# Patient Record
Sex: FEMALE | Race: WHITE | Employment: OTHER | URBAN - METROPOLITAN AREA
[De-identification: names, ages, dates, MRNs, and addresses within clinical notes are randomized per-mention and may not be internally consistent; named-entity substitution may affect disease eponyms.]

---

## 2017-12-21 PROBLEM — M17.11 PRIMARY OSTEOARTHRITIS OF RIGHT KNEE: Status: ACTIVE | Noted: 2017-12-21

## 2017-12-21 PROBLEM — S83.241A TEAR OF MEDIAL MENISCUS OF RIGHT KNEE, CURRENT: Status: ACTIVE | Noted: 2017-12-21

## 2018-02-05 PROBLEM — Z98.890 STATUS POST ARTHROSCOPY OF RIGHT KNEE: Status: ACTIVE | Noted: 2018-02-05

## 2022-07-29 PROBLEM — R42 LIGHTHEADEDNESS: Status: ACTIVE | Noted: 2022-07-29

## 2023-01-23 ENCOUNTER — OFFICE VISIT (OUTPATIENT)
Dept: RHEUMATOLOGY | Age: 73
End: 2023-01-23
Payer: MEDICARE

## 2023-01-23 VITALS
BODY MASS INDEX: 35.85 KG/M2 | HEIGHT: 64 IN | WEIGHT: 210 LBS | DIASTOLIC BLOOD PRESSURE: 76 MMHG | RESPIRATION RATE: 18 BRPM | SYSTOLIC BLOOD PRESSURE: 130 MMHG | OXYGEN SATURATION: 96 % | HEART RATE: 71 BPM

## 2023-01-23 DIAGNOSIS — R76.8 POSITIVE ANA (ANTINUCLEAR ANTIBODY): Primary | ICD-10-CM

## 2023-01-23 DIAGNOSIS — Z79.4 TYPE 2 DIABETES MELLITUS WITHOUT COMPLICATION, WITH LONG-TERM CURRENT USE OF INSULIN (HCC): ICD-10-CM

## 2023-01-23 DIAGNOSIS — Z11.59 ENCOUNTER FOR SCREENING FOR OTHER VIRAL DISEASES: ICD-10-CM

## 2023-01-23 DIAGNOSIS — M13.0 POLYARTHRITIS: ICD-10-CM

## 2023-01-23 DIAGNOSIS — R53.83 OTHER FATIGUE: ICD-10-CM

## 2023-01-23 DIAGNOSIS — E11.9 TYPE 2 DIABETES MELLITUS WITHOUT COMPLICATION, WITH LONG-TERM CURRENT USE OF INSULIN (HCC): ICD-10-CM

## 2023-01-23 DIAGNOSIS — R74.8 ELEVATED CK: ICD-10-CM

## 2023-01-23 PROCEDURE — 2022F DILAT RTA XM EVC RTNOPTHY: CPT | Performed by: INTERNAL MEDICINE

## 2023-01-23 PROCEDURE — G8417 CALC BMI ABV UP PARAM F/U: HCPCS | Performed by: INTERNAL MEDICINE

## 2023-01-23 PROCEDURE — 1090F PRES/ABSN URINE INCON ASSESS: CPT | Performed by: INTERNAL MEDICINE

## 2023-01-23 PROCEDURE — 3017F COLORECTAL CA SCREEN DOC REV: CPT | Performed by: INTERNAL MEDICINE

## 2023-01-23 PROCEDURE — 99204 OFFICE O/P NEW MOD 45 MIN: CPT | Performed by: INTERNAL MEDICINE

## 2023-01-23 PROCEDURE — G8427 DOCREV CUR MEDS BY ELIG CLIN: HCPCS | Performed by: INTERNAL MEDICINE

## 2023-01-23 PROCEDURE — 1123F ACP DISCUSS/DSCN MKR DOCD: CPT | Performed by: INTERNAL MEDICINE

## 2023-01-23 PROCEDURE — G8484 FLU IMMUNIZE NO ADMIN: HCPCS | Performed by: INTERNAL MEDICINE

## 2023-01-23 PROCEDURE — 1036F TOBACCO NON-USER: CPT | Performed by: INTERNAL MEDICINE

## 2023-01-23 PROCEDURE — 3046F HEMOGLOBIN A1C LEVEL >9.0%: CPT | Performed by: INTERNAL MEDICINE

## 2023-01-23 PROCEDURE — G8400 PT W/DXA NO RESULTS DOC: HCPCS | Performed by: INTERNAL MEDICINE

## 2023-01-23 RX ORDER — FERROUS SULFATE 325(65) MG
TABLET ORAL
COMMUNITY
Start: 2023-01-05

## 2023-01-23 RX ORDER — EMPAGLIFLOZIN 25 MG/1
TABLET, FILM COATED ORAL
COMMUNITY
Start: 2022-12-12

## 2023-01-23 RX ORDER — FUROSEMIDE 40 MG/1
TABLET ORAL AS NEEDED
COMMUNITY
Start: 2023-01-04

## 2023-01-23 ASSESSMENT — ENCOUNTER SYMPTOMS
COUGH: 0
TROUBLE SWALLOWING: 0
SHORTNESS OF BREATH: 0
COLOR CHANGE: 0
ABDOMINAL PAIN: 0
NAUSEA: 0
DIARRHEA: 0
VOMITING: 0

## 2023-01-23 NOTE — PROGRESS NOTES
Tony Gonzalez 1950 is a 67 y.o. female, here for evaluation of the following chief complaint(s):  New Patient (Patient here as a new patient for elevated NOAH, hx of OA. )         ASSESSMENT/PLAN:    Tony Gonzalez 1950 is a 67 y.o. female seen in consult for positive NOAH. 1.  Positive NOAH-she has a titer of 1: 320 which is a significantly elevated titer. She has multiple symptoms which raise some suspicion for differing autoimmune etiologies. She has some new onset jaw claudication and shoulder stiffness which raises some suspicion for GCA/PMR. She has history of erythema around the eyes and erythema on the chest which could be suggestive of shawl sign. That has improved but there is still some erythema on the chest.  She also has some dry skin on the hands which could be consistent with mechanics hands and slightly elevated CK of 897 which could be suggestive of dermatomyositis. Lupus would be in the differential as well. That said roughly 10% of the population will also have a positive NOAH without any underlying systemic connective tissue disease. I am not giving her any autoimmune diagnosis today but certainly will need further work-up as below. Also recommend that she stay up-to-date on age-appropriate cancer screenings. 2.  Type 2 diabetes-we will try to limit steroids is much as possible. 1. Positive NOAH (antinuclear antibody)  -     NOAH; Future  -     Miscellaneous sendout 3; Future  -     Miscellaneous Sendout; Future  -     Miscellaneous Sendout; Future  -     Aldolase; Future  -     ANCA Vasculitis Profile; Future  -     Angiotensin Converting Enzyme; Future  -     CK; Future  -     C-Reactive Protein; Future  -     Cyclic Citrul Peptide Antibody, IgG; Future  -     Rheumatoid Factor; Future  -     Sedimentation Rate; Future  -     Protein / creatinine ratio, urine; Future  -     Hepatitis B Core Antibody, Total; Future  -     Hepatitis B Surface Antibody;  Future  - Hepatitis B Surface Antigen; Future  -     Hepatitis C Antibody; Future  -     C3 Complement; Future  -     C4 Complement; Future  -     CBC with Auto Differential; Future  -     Comprehensive Metabolic Panel; Future  -     Sjogren's Ab (SS-A, SS-B); Future  -     Anti-RNP (MATTI Ab); Future  -     Gibbs (MATTI) Antibody IgG; Future  -     Anti-DNA Antibody, Double-Stranded; Future  -     Urinalysis; Future  -     Anti-Scleroderma Antibody; Future  -     Electrophoresis Protein, Serum; Future  -     Immunofixation serum profile; Future  -     IgG, IgA, IgM; Future  -     Lyme Disease Acute Reflexive Panel; Future  -     TSH; Future  2. Polyarthritis  -     NOAH; Future  -     Miscellaneous sendout 3; Future  -     Miscellaneous Sendout; Future  -     Miscellaneous Sendout; Future  -     Aldolase; Future  -     ANCA Vasculitis Profile; Future  -     Angiotensin Converting Enzyme; Future  -     CK; Future  -     C-Reactive Protein; Future  -     Cyclic Citrul Peptide Antibody, IgG; Future  -     Rheumatoid Factor; Future  -     Sedimentation Rate; Future  -     Protein / creatinine ratio, urine; Future  -     Hepatitis B Core Antibody, Total; Future  -     Hepatitis B Surface Antibody; Future  -     Hepatitis B Surface Antigen; Future  -     Hepatitis C Antibody; Future  -     C3 Complement; Future  -     C4 Complement; Future  -     CBC with Auto Differential; Future  -     Comprehensive Metabolic Panel; Future  -     Sjogren's Ab (SS-A, SS-B); Future  -     Anti-RNP (MATTI Ab); Future  -     Gibbs (MATTI) Antibody IgG; Future  -     Anti-DNA Antibody, Double-Stranded; Future  -     Urinalysis; Future  -     Anti-Scleroderma Antibody; Future  -     Electrophoresis Protein, Serum; Future  -     Immunofixation serum profile; Future  -     IgG, IgA, IgM; Future  -     Lyme Disease Acute Reflexive Panel; Future  -     TSH; Future  3. Other fatigue  -     NOAH;  Future  -     Miscellaneous sendout 3; Future  -     Miscellaneous Sendout; Future  -     Miscellaneous Sendout; Future  -     Aldolase; Future  -     ANCA Vasculitis Profile; Future  -     Angiotensin Converting Enzyme; Future  -     CK; Future  -     C-Reactive Protein; Future  -     Cyclic Citrul Peptide Antibody, IgG; Future  -     Rheumatoid Factor; Future  -     Sedimentation Rate; Future  -     Protein / creatinine ratio, urine; Future  -     Hepatitis B Core Antibody, Total; Future  -     Hepatitis B Surface Antibody; Future  -     Hepatitis B Surface Antigen; Future  -     Hepatitis C Antibody; Future  -     C3 Complement; Future  -     C4 Complement; Future  -     CBC with Auto Differential; Future  -     Comprehensive Metabolic Panel; Future  -     Sjogren's Ab (SS-A, SS-B); Future  -     Anti-RNP (MATTI Ab); Future  -     Gibbs (MATTI) Antibody IgG; Future  -     Anti-DNA Antibody, Double-Stranded; Future  -     Urinalysis; Future  -     Anti-Scleroderma Antibody; Future  -     Electrophoresis Protein, Serum; Future  -     Immunofixation serum profile; Future  -     IgG, IgA, IgM; Future  -     Lyme Disease Acute Reflexive Panel; Future  -     TSH; Future  4. Elevated CK  -     NOAH; Future  -     Miscellaneous sendout 3; Future  -     Miscellaneous Sendout; Future  -     Miscellaneous Sendout; Future  -     Aldolase; Future  -     ANCA Vasculitis Profile; Future  -     Angiotensin Converting Enzyme; Future  -     CK; Future  -     C-Reactive Protein; Future  -     Cyclic Citrul Peptide Antibody, IgG; Future  -     Rheumatoid Factor; Future  -     Sedimentation Rate; Future  -     Protein / creatinine ratio, urine; Future  -     Hepatitis B Core Antibody, Total; Future  -     Hepatitis B Surface Antibody; Future  -     Hepatitis B Surface Antigen; Future  -     Hepatitis C Antibody; Future  -     C3 Complement; Future  -     C4 Complement; Future  -     CBC with Auto Differential; Future  -     Comprehensive Metabolic Panel; Future  -     Sjogren's Ab (SS-A, SS-B);  Future  - Anti-RNP (MATTI Ab); Future  -     Gibbs (MATTI) Antibody IgG; Future  -     Anti-DNA Antibody, Double-Stranded; Future  -     Urinalysis; Future  -     Anti-Scleroderma Antibody; Future  -     Electrophoresis Protein, Serum; Future  -     Immunofixation serum profile; Future  -     IgG, IgA, IgM; Future  -     Lyme Disease Acute Reflexive Panel; Future  -     TSH; Future  5. Encounter for screening for other viral diseases   -     Hepatitis B Core Antibody, Total; Future  -     Hepatitis B Surface Antibody; Future  -     Hepatitis B Surface Antigen; Future  6. Type 2 diabetes mellitus without complication, with long-term current use of insulin ([facility] [address]      Return in about 4 weeks (around 2/20/2023). Subjective   SUBJECTIVE/OBJECTIVE:    HPI: Jared Carson 1950 is a 67 y.o. female seen in consult for positive NOAH. Patient states that around Thanksgiving she started having lower extremity edema as well as some swelling in the face particularly around the eyes. She states that she also had erythema around the eyes at that time and erythema on the chest which has since resolved. She states that she was given a diuretic and the leg swelling improved. She states that for the last 2 weeks she has been having jaw pain when she chews. She has not had headache or vision changes. She did have 1 isolated low-grade fever about 3 weeks ago. She also has some stiffness in the shoulders and hands. She is having a lot of issues with fatigue off and on. She apparently saw nephrology and they put her on an iron pill. It in review of records from her PCP there is mention of nephrotic syndrome and she does have insulin-dependent diabetes. She states that she was put on a course of steroids around Thanksgiving and then again just recently that she finished a 10-day course about 4 days ago.   Patient states that she does not really notice that she felt a whole lot different but her  feels like she did feel better on the steroids. She states that today is a decent day. She states that the hips are maybe a little stiff but not overly bothersome. She has also noticed the skin on her hands peeling. She has had a 23 pound weight loss since Thanksgiving but has not had much of an appetite. She has Raynaud's but this is a longstanding issue since she was a teenager. I reviewed blood work from her PCP which shows a positive NOAH of 1: 320, low positive rheumatoid factor of 18, and an elevated CK of 897. Past Medical History:   Diagnosis Date    Diabetes ([facility] [address]     Hyperlipemia     Hypertension     MMT (medial meniscus tear)     RIGHT  KNEE  AND SCHEDULED FOR THE SURGERY ON 01/30/2018    Primary osteoarthritis of right knee     Thyroid disease         Review of Systems   Constitutional:  Positive for fatigue. Negative for fever. HENT:  Negative for mouth sores and trouble swallowing. Respiratory:  Negative for cough and shortness of breath. Cardiovascular:  Positive for leg swelling. Negative for chest pain. Gastrointestinal:  Negative for abdominal pain, diarrhea, nausea and vomiting. Genitourinary:  Negative for dysuria and hematuria. Musculoskeletal:  Positive for arthralgias. Negative for joint swelling. Skin:  Positive for rash. Negative for color change. Neurological:  Positive for weakness. Negative for numbness. Hematological:  Negative for adenopathy. All other systems reviewed and are negative. Objective   Vitals:    01/23/23 1102   BP: 130/76   Pulse: 71   Resp: 18   SpO2: 96%      Physical Exam  Constitutional:       General: She is not in acute distress. Appearance: Normal appearance. HENT:      Head: Normocephalic and atraumatic. Right Ear: External ear normal.      Left Ear: External ear normal.      Nose: Nose normal.   Eyes:      General: No scleral icterus.   Pulmonary:      Effort: Pulmonary effort is normal.   Musculoskeletal:         General: Tenderness present. No swelling or deformity. Comments: She has Heberden's nodes. There is some tenderness in the shoulders but no synovitis on exam.   Skin:     General: Skin is warm and dry. Findings: No rash. Comments: Chest is a little erythematous. She does have dry skin on the hands which could be consistent with mechanics hands but no Gottron's papules. Neurological:      General: No focal deficit present. Mental Status: She is alert and oriented to person, place, and time. Mental status is at baseline. Comments: Strength is 4+ out of 5 in the hip flexors. 5 out of 5 throughout elsewhere. Psychiatric:         Mood and Affect: Mood normal.         Behavior: Behavior normal.          Lab Results   Component Value Date    WBC 7.6 07/30/2022    HGB 11.1 (L) 07/30/2022    HCT 34.0 (L) 07/30/2022    MCV 86.4 07/30/2022     07/30/2022     Lab Results   Component Value Date     07/30/2022    K 4.2 07/30/2022     07/30/2022    CO2 27 07/30/2022    BUN 19 07/30/2022    CREATININE 0.74 07/30/2022    GLUCOSE 106 (H) 07/30/2022    CALCIUM 8.4 07/30/2022    PROT 6.7 07/30/2022    LABALBU 3.5 07/30/2022    BILITOT 0.2 07/30/2022    ALKPHOS 68 07/30/2022    AST 26 07/30/2022    ALT 15 07/30/2022     No results found for: NOAH  No results found for: RHEUMFACTOR  No results found for: SEDRATE  No results found for: CRP         An electronic signature was used to authenticate this note. This note was generated with a voice recognition dictation system. Please disregard any errors or omission which have escaped my review.     --Raphael Eldridge, DO

## 2023-01-23 NOTE — PATIENT INSTRUCTIONS
You have some features that could be concerning for underlying autoimmune disease but will need further workup

## 2023-01-31 DIAGNOSIS — R53.83 OTHER FATIGUE: ICD-10-CM

## 2023-01-31 DIAGNOSIS — R76.8 POSITIVE ANA (ANTINUCLEAR ANTIBODY): ICD-10-CM

## 2023-01-31 DIAGNOSIS — R74.8 ELEVATED CK: ICD-10-CM

## 2023-01-31 DIAGNOSIS — Z11.59 ENCOUNTER FOR SCREENING FOR OTHER VIRAL DISEASES: ICD-10-CM

## 2023-01-31 DIAGNOSIS — M13.0 POLYARTHRITIS: ICD-10-CM

## 2023-01-31 LAB
ALBUMIN SERPL-MCNC: 3.5 G/DL (ref 3.5–5.2)
ALP BLD-CCNC: 57 U/L (ref 35–104)
ALT SERPL-CCNC: 105 U/L (ref 0–32)
ANION GAP SERPL CALCULATED.3IONS-SCNC: 18 MMOL/L (ref 7–16)
AST SERPL-CCNC: 194 U/L (ref 0–31)
BACTERIA: ABNORMAL /HPF
BASOPHILS ABSOLUTE: 0.02 E9/L (ref 0–0.2)
BASOPHILS RELATIVE PERCENT: 0.4 % (ref 0–2)
BILIRUB SERPL-MCNC: 0.4 MG/DL (ref 0–1.2)
BILIRUBIN URINE: NEGATIVE
BLOOD, URINE: NEGATIVE
BUN BLDV-MCNC: 37 MG/DL (ref 6–23)
C-REACTIVE PROTEIN: 3.5 MG/DL (ref 0–0.4)
C3 COMPLEMENT: 145 MG/DL (ref 90–180)
C4 COMPLEMENT: 25 MG/DL (ref 10–40)
CALCIUM SERPL-MCNC: 9.3 MG/DL (ref 8.6–10.2)
CHLORIDE BLD-SCNC: 101 MMOL/L (ref 98–107)
CLARITY: ABNORMAL
CO2: 19 MMOL/L (ref 22–29)
COLOR: YELLOW
CREAT SERPL-MCNC: 1.3 MG/DL (ref 0.5–1)
CREATININE URINE: 181 MG/DL (ref 29–226)
EOSINOPHILS ABSOLUTE: 0.24 E9/L (ref 0.05–0.5)
EOSINOPHILS RELATIVE PERCENT: 4.6 % (ref 0–6)
GFR SERPL CREATININE-BSD FRML MDRD: 43 ML/MIN/1.73
GLUCOSE BLD-MCNC: 129 MG/DL (ref 74–99)
GLUCOSE URINE: >=1000 MG/DL
HCT VFR BLD CALC: 37 % (ref 34–48)
HEMOGLOBIN: 11.2 G/DL (ref 11.5–15.5)
IMMATURE GRANULOCYTES #: 0.02 E9/L
IMMATURE GRANULOCYTES %: 0.4 % (ref 0–5)
KETONES, URINE: NEGATIVE MG/DL
LEUKOCYTE ESTERASE, URINE: ABNORMAL
LYMPHOCYTES ABSOLUTE: 0.77 E9/L (ref 1.5–4)
LYMPHOCYTES RELATIVE PERCENT: 14.9 % (ref 20–42)
MCH RBC QN AUTO: 27.1 PG (ref 26–35)
MCHC RBC AUTO-ENTMCNC: 30.3 % (ref 32–34.5)
MCV RBC AUTO: 89.6 FL (ref 80–99.9)
MONOCYTES ABSOLUTE: 0.62 E9/L (ref 0.1–0.95)
MONOCYTES RELATIVE PERCENT: 12 % (ref 2–12)
NEUTROPHILS ABSOLUTE: 3.5 E9/L (ref 1.8–7.3)
NEUTROPHILS RELATIVE PERCENT: 67.7 % (ref 43–80)
NITRITE, URINE: NEGATIVE
PDW BLD-RTO: 18 FL (ref 11.5–15)
PH UA: 5.5 (ref 5–9)
PLATELET # BLD: 227 E9/L (ref 130–450)
PMV BLD AUTO: 11.3 FL (ref 7–12)
POTASSIUM SERPL-SCNC: 5.6 MMOL/L (ref 3.5–5)
PROTEIN PROTEIN: 9 MG/DL (ref 0–12)
PROTEIN UA: NEGATIVE MG/DL
PROTEIN/CREAT RATIO: 0
PROTEIN/CREAT RATIO: 0 (ref 0–0.2)
RBC # BLD: 4.13 E12/L (ref 3.5–5.5)
RBC UA: ABNORMAL /HPF (ref 0–2)
RHEUMATOID FACTOR: 18 IU/ML (ref 0–13)
SODIUM BLD-SCNC: 138 MMOL/L (ref 132–146)
SPECIFIC GRAVITY UA: 1.02 (ref 1–1.03)
TOTAL CK: 2495 U/L (ref 20–180)
TSH SERPL DL<=0.05 MIU/L-ACNC: 22.89 UIU/ML (ref 0.27–4.2)
UROBILINOGEN, URINE: 0.2 E.U./DL
WBC # BLD: 5.2 E9/L (ref 4.5–11.5)
WBC UA: ABNORMAL /HPF (ref 0–5)

## 2023-02-01 LAB
ANTI DNA DOUBLE STRANDED: NEGATIVE
ANTI-NUCLEAR ANTIBODY (ANA): NEGATIVE
ENA TO RNP ANTIBODY: NEGATIVE
ENA TO SMITH (SM) ANTIBODY: NEGATIVE
ENA TO SSA (RO) ANTIBODY: NEGATIVE
ENA TO SSB (LA) ANTIBODY: NEGATIVE
HBV SURFACE AB TITR SER: NORMAL {TITER}
HEPATITIS B SURFACE ANTIGEN INTERPRETATION: NORMAL
HEPATITIS C ANTIBODY INTERPRETATION: NORMAL
SCLERODERMA (SCL-70) AB: NEGATIVE
SEDIMENTATION RATE, ERYTHROCYTE: 60 MM/HR (ref 0–20)

## 2023-02-02 LAB — HEPATITIS B CORE TOTAL ANTIBODY: NONREACTIVE

## 2023-02-03 LAB
ALBUMIN SERPL-MCNC: 2.1 G/DL (ref 3.5–4.7)
ALDOLASE: 19.3 U/L (ref 1.2–7.6)
ALPHA-1-GLOBULIN: 0.4 G/DL (ref 0.2–0.4)
ALPHA-2-GLOBULIN: 1.2 G/DL (ref 0.5–1)
ANGIOTENSIN CONVERTING ENZYME: <10 U/L (ref 16–85)
BETA GLOBULIN: 1.3 G/DL (ref 0.8–1.3)
CYCLIC CITRULLINATED PEPTIDE ANTIBODY IGG: 1 U/ML (ref 0–7)
ELECTROPHORESIS: ABNORMAL
GAMMA GLOBULIN: 1.4 G/DL (ref 0.7–1.6)
IGA: 229 MG/DL (ref 70–400)
IGG: 1107 MG/DL (ref 700–1600)
IGM: 115 MG/DL (ref 40–230)
IMMUNOFIXATION RESULT, SERUM: NORMAL
LYME, EIA: 0.26 LIV (ref 0–1.2)
TOTAL PROTEIN: 6.3 G/DL (ref 6.4–8.3)

## 2023-02-05 LAB
ANCA IFA PATTERN: NORMAL
ANCA IFA TITER: NORMAL
Lab: NORMAL
MYELOPEROXIDASE AB: 0 AU/ML (ref 0–19)
REPORT: NORMAL
SERINE PROTEASE 3 AB: 0 AU/ML (ref 0–19)
THIS TEST SENT TO: NORMAL

## 2023-02-07 LAB
Lab: NORMAL
REPORT: NORMAL
THIS TEST SENT TO: NORMAL

## 2023-02-21 LAB
Lab: NORMAL
REPORT: NORMAL
THIS TEST SENT TO: NORMAL

## 2023-02-27 ENCOUNTER — TELEPHONE (OUTPATIENT)
Dept: RHEUMATOLOGY | Age: 73
End: 2023-02-27

## 2023-02-27 ENCOUNTER — OFFICE VISIT (OUTPATIENT)
Dept: RHEUMATOLOGY | Age: 73
End: 2023-02-27
Payer: MEDICARE

## 2023-02-27 VITALS — HEIGHT: 64 IN | BODY MASS INDEX: 35.85 KG/M2 | WEIGHT: 210 LBS

## 2023-02-27 DIAGNOSIS — E03.9 ACQUIRED HYPOTHYROIDISM: ICD-10-CM

## 2023-02-27 DIAGNOSIS — Z79.4 TYPE 2 DIABETES MELLITUS WITHOUT COMPLICATION, WITH LONG-TERM CURRENT USE OF INSULIN (HCC): ICD-10-CM

## 2023-02-27 DIAGNOSIS — E11.9 TYPE 2 DIABETES MELLITUS WITHOUT COMPLICATION, WITH LONG-TERM CURRENT USE OF INSULIN (HCC): ICD-10-CM

## 2023-02-27 DIAGNOSIS — E66.01 SEVERE OBESITY (BMI 35.0-39.9) WITH COMORBIDITY (HCC): ICD-10-CM

## 2023-02-27 DIAGNOSIS — M35.3 POLYMYALGIA (HCC): Primary | ICD-10-CM

## 2023-02-27 DIAGNOSIS — M35.3 POLYMYALGIA (HCC): ICD-10-CM

## 2023-02-27 DIAGNOSIS — Q99.8 MYOSITIS ASSOCIATED ANTIBODY POSITIVE: ICD-10-CM

## 2023-02-27 DIAGNOSIS — I10 ESSENTIAL HYPERTENSION: ICD-10-CM

## 2023-02-27 DIAGNOSIS — R76.8 POSITIVE ANA (ANTINUCLEAR ANTIBODY): ICD-10-CM

## 2023-02-27 LAB
C-REACTIVE PROTEIN: 1.5 MG/DL (ref 0–0.4)
SEDIMENTATION RATE, ERYTHROCYTE: 48 MM/HR (ref 0–20)
TOTAL CK: 1969 U/L (ref 20–180)

## 2023-02-27 PROCEDURE — G8400 PT W/DXA NO RESULTS DOC: HCPCS | Performed by: INTERNAL MEDICINE

## 2023-02-27 PROCEDURE — 1090F PRES/ABSN URINE INCON ASSESS: CPT | Performed by: INTERNAL MEDICINE

## 2023-02-27 PROCEDURE — 3046F HEMOGLOBIN A1C LEVEL >9.0%: CPT | Performed by: INTERNAL MEDICINE

## 2023-02-27 PROCEDURE — G8417 CALC BMI ABV UP PARAM F/U: HCPCS | Performed by: INTERNAL MEDICINE

## 2023-02-27 PROCEDURE — 1123F ACP DISCUSS/DSCN MKR DOCD: CPT | Performed by: INTERNAL MEDICINE

## 2023-02-27 PROCEDURE — 99215 OFFICE O/P EST HI 40 MIN: CPT | Performed by: INTERNAL MEDICINE

## 2023-02-27 PROCEDURE — 2022F DILAT RTA XM EVC RTNOPTHY: CPT | Performed by: INTERNAL MEDICINE

## 2023-02-27 PROCEDURE — 1036F TOBACCO NON-USER: CPT | Performed by: INTERNAL MEDICINE

## 2023-02-27 PROCEDURE — G8484 FLU IMMUNIZE NO ADMIN: HCPCS | Performed by: INTERNAL MEDICINE

## 2023-02-27 PROCEDURE — 3017F COLORECTAL CA SCREEN DOC REV: CPT | Performed by: INTERNAL MEDICINE

## 2023-02-27 PROCEDURE — G8427 DOCREV CUR MEDS BY ELIG CLIN: HCPCS | Performed by: INTERNAL MEDICINE

## 2023-02-27 ASSESSMENT — ENCOUNTER SYMPTOMS
ABDOMINAL PAIN: 0
NAUSEA: 0
SHORTNESS OF BREATH: 0
COUGH: 0
DIARRHEA: 0
COLOR CHANGE: 1
VOMITING: 0
TROUBLE SWALLOWING: 1

## 2023-02-27 NOTE — PATIENT INSTRUCTIONS
Your symptoms may very well be thyroid related but I have decent suspicion for underlying autoimmune muscle disease    Have EMG and CT scans

## 2023-02-27 NOTE — PROGRESS NOTES
José Miguel Boyle 1950 is a 67 y.o. female, here for evaluation of the following chief complaint(s):  Follow-up (Patient here for follow up on blood work and positive gregg, polyarthritis. )         ASSESSMENT/PLAN:    José Miguel Boyle 1950 is a 67 y.o. female seen in follow-up for polymyalgia and positive GREGG. 1.  Polymyalgia/muscle weakness-she was initially referred for positive GREGG which upon repeat is negative. MATTI is negative as well. CK and aldolase are elevated. CK is now in the 2000 range. Inflammatory markers are elevated as well. However her TSH was also very high at 22. Her myositis panel did come back with a week positive OJ antibody which can be seen with antisynthetase syndrome. 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 She is seeing her PCP and getting the thyroid regulated. Her Synthroid dose was just increased. 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 Last visit she had a faint shawl rash which has since resolved. She does appear to be have some mechanics hands. She does not have distinct Gottron papules but a little bit of erythema over a few MCP joints. She has Raynaud's but this is longstanding since she was 24years old. She does not really have inflammatory arthritis. She has no respiratory symptoms. At this point from our end we will let primary care sort things out with her thyroid but we will check a CT of the chest abdomen and pelvis as there is an association with malignancy with antisynthetase syndrome. We will also get an EMG of the right upper extremity and may need to consider muscle biopsy.   She is otherwise clinically stable at this point so we did give consideration to the possibility of IVIG, immunosuppressive's such as Rituxan, and steroids but will hold off for right now. Also recommend she stay up-to-date on age-appropriate cancer screening. 2.  Positive NOAH-this is what she was referred here for. She initially had a titer of 1: 320. Upon repeat is actually negative. As above I have some suspicion for antisynthetase syndrome and will look into that further. 3.  Type 2 diabetes-we will need to monitor blood sugar closely if we do have to put her on steroids which is likely if it turns out we are dealing with an inflammatory myositis. 4.  Hypothyroidism-as above her TSH was very high. Her PCP recently increased her Synthroid. We will take some time for her thyroid to get regulated. Hypothyroidism certainly can cause a lot of her symptoms but with positive OJ antibody I have a fair suspicion we may be dealing with a combination of things at this point. 1. Polymyalgia ([address]  -     CT ABDOMEN PELVIS W WO CONTRAST Additional Contrast? None; Future  -     CT CHEST W WO CONTRAST; Future  -     EMG; Future  -     Aldolase; Future  -     CK; Future  -     C-Reactive Protein; Future  -     Sedimentation Rate; Future  2. Severe obesity (BMI 35.0-39. 9) with comorbidity ([address]  3. Type 2 diabetes mellitus without complication, with long-term current use of insulin (HCC)  -     EMG; Future  -     Aldolase; Future  -     CK; Future  -     C-Reactive Protein; Future  -     Sedimentation Rate; Future  4. Essential hypertension  5. Myositis associated antibody positive  -     CT ABDOMEN PELVIS W WO CONTRAST Additional Contrast? None; Future  -     CT CHEST W WO CONTRAST; Future  -     EMG; Future  -     Aldolase; Future  -     CK; Future  -     C-Reactive Protein; Future  -     Sedimentation Rate; Future  6. Positive NOAH (antinuclear antibody)  7. Acquired hypothyroidism      Return in about 4 weeks (around 3/27/2023).          Subjective   SUBJECTIVE/OBJECTIVE:    HPI: Sara Hayward 1950 is a 67 y.o. female seen in follow-up for positive NOAH, muscle pain and weakness, elevated CK. .  Last visit we did extensive blood work and repeat NOAH was actually negative. She had elevated aldolase and CK now in the 2000 range. Inflammatory markers were elevated as well. However her TSH was very high at 22. Her myositis panel just came back and actually also showed a weak positive OJ antibody. Patient states that things are essentially unchanged. She still has muscle pain and weakness. She has a little trouble swallowing. Her hands are dry and cracked and stiff. She does have Raynaud's but has had this since she was about 17. She denies chest pain or shortness of breath. Initial history: Patient states that around Thanksgiving she started having lower extremity edema as well as some swelling in the face particularly around the eyes. She states that she also had erythema around the eyes at that time and erythema on the chest which has since resolved. She states that she was given a diuretic and the leg swelling improved. She states that for the last 2 weeks she has been having jaw pain when she chews. She has not had headache or vision changes. She did have 1 isolated low-grade fever about 3 weeks ago. She also has some stiffness in the shoulders and hands. She is having a lot of issues with fatigue off and on. She apparently saw nephrology and they put her on an iron pill. It in review of records from her PCP there is mention of nephrotic syndrome and she does have insulin-dependent diabetes. She states that she was put on a course of steroids around Thanksgiving and then again just recently that she finished a 10-day course about 4 days ago. Patient states that she does not really notice that she felt a whole lot different but her  feels like she did feel better on the steroids. She states that today is a decent day.   She states that the hips are maybe a little stiff but not overly bothersome. She has also noticed the skin on her hands peeling. She has had a 23 pound weight loss since Thanksgiving but has not had much of an appetite. She has Raynaud's but this is a longstanding issue since she was a teenager. I reviewed blood work from her PCP which shows a positive NOAH of 1: 320, low positive rheumatoid factor of 18, and an elevated CK of 897. Past Medical History:   Diagnosis Date    Diabetes ([address]     Hyperlipemia     Hypertension     MMT (medial meniscus tear)     RIGHT  KNEE  AND SCHEDULED FOR THE SURGERY ON 01/30/2018    Primary osteoarthritis of right knee     Thyroid disease         Review of Systems   Constitutional:  Positive for fatigue. Negative for fever. HENT:  Positive for trouble swallowing. Negative for mouth sores. Respiratory:  Negative for cough and shortness of breath. Cardiovascular:  Negative for chest pain. Gastrointestinal:  Negative for abdominal pain, diarrhea, nausea and vomiting. Genitourinary:  Negative for dysuria and hematuria. Musculoskeletal:  Positive for arthralgias and myalgias. Negative for joint swelling. Skin:  Positive for color change. Negative for rash. Neurological:  Positive for weakness. Negative for numbness. Hematological:  Negative for adenopathy. All other systems reviewed and are negative. Objective   There were no vitals filed for this visit. Physical Exam  Constitutional:       General: She is not in acute distress. Appearance: Normal appearance. HENT:      Head: Normocephalic and atraumatic. Right Ear: External ear normal.      Left Ear: External ear normal.      Nose: Nose normal.   Eyes:      General: No scleral icterus. Pulmonary:      Effort: Pulmonary effort is normal.   Musculoskeletal:         General: Deformity present. No swelling or tenderness. Comments: She has Heberden's nodes. Skin:     General: Skin is warm and dry. Findings: No rash.       Comments: She does have dry skin on the hands which could be consistent with mechanics hands no obvious Gottron's papules but she does have some erythema over couple of her MCP joints.   Neurological:      General: No focal deficit present.      Mental Status: She is alert and oriented to person, place, and time. Mental status is at baseline.      Comments: Strength is 4+ out of 5 in the hip flexors.  4+ out of 5 in the biceps, 4 out of 5 in the deltoids, 4 out of 5  strength.   Psychiatric:         Mood and Affect: Mood normal.         Behavior: Behavior normal.          Lab Results   Component Value Date    WBC 5.2 01/31/2023    HGB 11.2 (L) 01/31/2023    HCT 37.0 01/31/2023    MCV 89.6 01/31/2023     01/31/2023     Lab Results   Component Value Date     01/31/2023    K 5.6 (H) 01/31/2023     01/31/2023    CO2 19 (L) 01/31/2023    BUN 37 (H) 01/31/2023    CREATININE 1.3 (H) 01/31/2023    GLUCOSE 129 (H) 01/31/2023    CALCIUM 9.3 01/31/2023    PROT 6.3 (L) 01/31/2023    LABALBU 2.1 (L) 01/31/2023    LABALBU 3.5 01/31/2023    BILITOT 0.4 01/31/2023    ALKPHOS 57 01/31/2023     (H) 01/31/2023     (H) 01/31/2023    LABGLOM 43 01/31/2023     Lab Results   Component Value Date    NOAH NEGATIVE 01/31/2023     No results found for: RHEUMFACTOR  Lab Results   Component Value Date    SEDRATE 60 (H) 01/31/2023     Lab Results   Component Value Date    CRP 3.5 (H) 01/31/2023            An electronic signature was used to authenticate this note.    This note was generated with a voice recognition dictation system. Please disregard any errors or omission which have escaped my review.    --Jens Tello, DO

## 2023-02-27 NOTE — TELEPHONE ENCOUNTER
Called Rafaela Allan at [facility] EMG scheduling and left her a detailed message informing that this patient needs scheduled for an EMG.     Rafaela Jerrell returned call leaving message on the clinical line stating that she left a message for the patient to call her back to schedule the EMG test.      Electronically signed by Harjinder Grace CMA on 2/27/2023 at 2:09 PM

## 2023-03-01 ENCOUNTER — TELEPHONE (OUTPATIENT)
Dept: RHEUMATOLOGY | Age: 73
End: 2023-03-01

## 2023-03-01 DIAGNOSIS — R76.8 POSITIVE ANA (ANTINUCLEAR ANTIBODY): Primary | ICD-10-CM

## 2023-03-01 LAB — ALDOLASE: 16.9 U/L (ref 1.2–7.6)

## 2023-03-01 NOTE — TELEPHONE ENCOUNTER
Patient is scheduled for CT Chest/Abd/Pelvis on 04/15, [address] central scheduling called asking about having the patient get updated blood work prior to the CT scans. Please advise and place order if needed.       Electronically signed by Oscar Jacinto on 3/1/2023 at 2:48 PM

## 2023-03-02 NOTE — TELEPHONE ENCOUNTER
Patient is notified of this message, patient states that she will get the blood test done at her next OV w/Dr. Issac Ron on 04/13.     Electronically signed by Oscar Pendleton on 3/2/2023 at 8:19 AM

## 2023-03-03 ENCOUNTER — PROCEDURE VISIT (OUTPATIENT)
Dept: PHYSICAL MEDICINE AND REHAB | Age: 73
End: 2023-03-03

## 2023-03-03 VITALS — BODY MASS INDEX: 35.85 KG/M2 | HEIGHT: 64 IN | WEIGHT: 210 LBS

## 2023-03-03 DIAGNOSIS — Z79.4 TYPE 2 DIABETES MELLITUS WITHOUT COMPLICATION, WITH LONG-TERM CURRENT USE OF INSULIN (HCC): ICD-10-CM

## 2023-03-03 DIAGNOSIS — E11.9 TYPE 2 DIABETES MELLITUS WITHOUT COMPLICATION, WITH LONG-TERM CURRENT USE OF INSULIN (HCC): ICD-10-CM

## 2023-03-03 DIAGNOSIS — Q99.8 MYOSITIS ASSOCIATED ANTIBODY POSITIVE: ICD-10-CM

## 2023-03-03 DIAGNOSIS — M35.3 POLYMYALGIA (HCC): ICD-10-CM

## 2023-03-03 NOTE — PATIENT INSTRUCTIONS
[facility]  Accredited by the [facility] with Exemplary status  GELACIO Marcial D.O. [facility]  [address]  Phone: [phone]  Fax: [phone]        Today you had an electrodiagnostic exam which included nerve conduction studies (NCS) and electromyography (EMG). This test evaluated the electrical activity of your nerves and muscles to help determine if you have a nerve or muscle disease. This test can help determine the location and type of a nerve or muscle problem. This will help your referring doctor diagnose your condition and determine the appropriate next step in your treatment plan. After your test:    1. There are no long lasting side effects of the test.     2. You may resume your normal activities without restrictions. 3.  Resume any medications that were stopped for the test.     4  If you have sore areas or bruising in your muscles where the needle was placed, apply a cold pack to the sore area for 15-20 minutes three to four times a day as needed for pain. The soreness should go away in about 1-2 days. 5. Your results were provided  Briefly at the end of your test and the final detailed report will be provided to your referring physician, and/or primary care physician and any other parties you requested within 1-2 days of the examination. You may wish to contact your referring provider after a few days to determine what they would like you to do next. 6.  Please call [phone] with any questions or concerns and if you develop increased body temperature/fever, swelling, tenderness, increased pain and/or drainage from the sites where the needle was placed. Thank you for choosing us for your health care needs.

## 2023-03-03 NOTE — PROGRESS NOTES
[address]  Electrodiagnostic Laboratory  *Accredited by the [address] [place] [address] with exemplary status  [address] [facility] [address]  Phone: [phone]  Fax: [phone]    Referring Provider: Shai Iglesias DO  Primary Care Physician: Iva Colunga DO  Patient Name: Camille Wright  Patient YOB: 1950  Gender: female  BMI: Body mass index is 36.05 kg/m². Height 5' 4\" (1.626 m), weight 210 lb (95.3 kg). 3/3/2023    Reason for Referral: RUE Polymyalgia    Description of clinical problem:   Chief Complaint   Patient presents with    Extremity Pain     Right arm muscle pain in the upper portion of the arm. Pain described as achy. Pain rated 8/10. Symptoms since November 2022. Numbness     Right hand numbness and tingling that radiates up the arm. Extremity Weakness     Right arm weakness. Sensory NCS      Nerve / Sites Rec. Site Peak Lat PP Amp Segments Distance Velocity Temp. ms µV  cm m/s °C   R Ulnar - Digit V (Antidromic)      Wrist Dig V 3.85 15.0 Wrist - Dig V 14 47 33.1       Motor NCS      Nerve / Sites Muscle Onset Amplitude Segments Distance Velocity Temp.     ms mV  cm m/s °C   R Ulnar - ADM      Wrist ADM 3.18 7.6 Wrist - ADM 8  33      B. Elbow ADM 6.77 7.1 B. Elbow - Wrist 20 56 32.9      A. Elbow ADM 8.70 7.1 A. Elbow - B. Elbow 10 52 32.9       F Wave      Nerve Fmin % F    ms %   R Ulnar - ADM 29.90 33.3       EMG      EMG Summary Table     Spontaneous MUAP Recruitment   Muscle Nerve Roots IA Fib PSW Fasc Amp Dur. PPP Pattern   R. Deltoid Axillary C5-C6 N None None None N N N N   R. Biceps brachii Musculocutaneous C5-C6 N None None None N N N N   R. Triceps brachii Radial C6-C8 N None None None N N N N   R. Pronator teres Median C6-C7 N None None None N N N N   R. First dorsal interosseous Ulnar C8-T1 N None None None N N N N   R.  Abductor pollicis brevis Median A8-V9 N None None None N N N N   R. Cervical paraspinals (low)  - N None None None N N N N   R. Cervical paraspinals (mid)  - N None None None N N N N        Study Limitations:  none    Summary of Findings:   Nerve conduction studies: Nerve conduction studies, as listed in the table were normal in latency, amplitude and conduction velocity. Needle EMG:   Needle EMG was performed using a concentric needle. Observed motor units were normal in amplitude, duration, phases and recruitment and no active denervation signs were seen. Diagnostic Interpretation: This study was normal. Specific to the question there was no evidence of a myopathic process in the right upper extremity. I cannot conclude definitively however regarding myopathy as only the right arm was ordered and the complete evaluation includes one arm and one leg but the examined right arm was normal.     Previous Study: There is not a prior study for comparison. Technologist: Jigar Longo LPN  Physician:    Senthil Cardenas D.O., P.T. Board Certified Physical Medicine and Rehabilitation  Board Certified Electrodiagnostic Medicine      Nerve conduction studies and electromyography were performed according to our laboratory policies and procedures which can be provided upon request. All abnormal values are identified in the table.  Laboratory normal values can also be provided upon request.       [facility]

## 2023-03-03 NOTE — PROGRESS NOTES
Electrodiagnostic Laboratory  *Accredited by the [facility] with exemplary status  [address] [facility] [address]  Phone: [phone]  Fax: [phone]      Date of Examination: 03/03/23  Patient Name: Alexandrea Soria    An independent historian was not needed. Alexandrea Soria  is a 67y.o. year old female who was seen today regarding   Chief Complaint   Patient presents with    Extremity Pain     Right arm muscle pain in the upper portion of the arm. Pain described as achy. Pain rated 8/10. Symptoms since November 2022. Numbness     Right hand numbness and tingling that radiates up the arm. Extremity Weakness     Right arm weakness. The symptoms started after no injury. The symptoms are constant. I have reviewed the referring provider's office note. Differential of myositis, polymyalgia rheumatica. Dr. Karen Norton office was contacted recommending work up for myopathy to include one arm and one leg. They returned call and requested we only test the right leg. Xray cervical 2021 showed bilateral C5-6, C6-7 foraminal stenosis due to degenerative changes. There is not a family history of neuromuscular conditions. Physical Exam: General: The patient is in no apparent distress. MSK: There is no joint effusion, deformity, instability, swelling, erythema or warmth. AROM is full in the spine and extremities. Spurling is negative. Tinel is negative. Neurologic:  No focal sensorimotor deficit. Reflexes 2+ and symmetric. Gait is normal.    Impression:     1. Polymyalgia ([address]    2. Type 2 diabetes mellitus without complication, with long-term current use of insulin ([address]    3. Myositis associated antibody positive        Plan:   EMG is indicated to evaluate the above diagnosis.     Orders Placed This Encounter   Procedures    KS NEEDLE EMG EA EXTREMTY W/PARASPINL AREA COMPLETE    KS NERVE CONDUCTION STUDIES 1-2 STUDIES     EMG was done today and showed a normal study limited to right arm at referring provider's request.    The patient was educated about the diagnosis and the prognosis.   Consider cervical spine imaging to evaluate for stenosis.   Advised patient to follow up with referring provider.       Thank you for allowing me to participate in the care of your patient.      Sincerely,     Linda Jarrell, DO

## 2023-04-14 ENCOUNTER — HOSPITAL ENCOUNTER (OUTPATIENT)
Dept: MRI IMAGING | Age: 73
Discharge: HOME | End: 2023-04-16
Payer: MEDICARE

## 2023-04-14 DIAGNOSIS — D89.89 ANTISYNTHETASE SYNDROME (HCC): ICD-10-CM

## 2023-04-14 PROCEDURE — 73218 MRI UPPER EXTREMITY W/O DYE: CPT

## 2023-04-15 ENCOUNTER — HOSPITAL ENCOUNTER (OUTPATIENT)
Dept: CT IMAGING | Age: 73
Discharge: HOME | End: 2023-04-17
Payer: MEDICARE

## 2023-04-15 DIAGNOSIS — M35.3 POLYMYALGIA (HCC): ICD-10-CM

## 2023-04-15 DIAGNOSIS — Q99.8 MYOSITIS ASSOCIATED ANTIBODY POSITIVE: ICD-10-CM

## 2023-04-15 PROCEDURE — 74178 CT ABD&PLV WO CNTR FLWD CNTR: CPT

## 2023-04-15 PROCEDURE — 6360000004 HC RX CONTRAST MEDICATION: Performed by: RADIOLOGY

## 2023-04-15 PROCEDURE — 71270 CT THORAX DX C-/C+: CPT

## 2023-04-15 RX ADMIN — IOPAMIDOL 75 ML: 755 INJECTION, SOLUTION INTRAVENOUS at 10:39

## 2023-04-17 DIAGNOSIS — R91.8 PULMONARY NODULES: ICD-10-CM

## 2023-04-17 DIAGNOSIS — M33.90 DERMATOMYOSITIS (HCC): ICD-10-CM

## 2023-04-17 DIAGNOSIS — R59.1 LYMPHADENOPATHY: ICD-10-CM

## 2023-04-20 ENCOUNTER — TELEPHONE (OUTPATIENT)
Dept: CASE MANAGEMENT | Age: 73
End: 2023-04-20

## 2023-04-20 ENCOUNTER — OFFICE VISIT (OUTPATIENT)
Dept: SURGERY | Age: 73
End: 2023-04-20
Payer: MEDICARE

## 2023-04-20 ENCOUNTER — OFFICE VISIT (OUTPATIENT)
Dept: ONCOLOGY | Age: 73
End: 2023-04-20
Payer: MEDICARE

## 2023-04-20 ENCOUNTER — HOSPITAL ENCOUNTER (OUTPATIENT)
Dept: INFUSION THERAPY | Age: 73
Discharge: HOME | End: 2023-04-20
Payer: MEDICARE

## 2023-04-20 VITALS
HEART RATE: 62 BPM | BODY MASS INDEX: 34.08 KG/M2 | OXYGEN SATURATION: 99 % | TEMPERATURE: 98.4 F | HEIGHT: 64 IN | DIASTOLIC BLOOD PRESSURE: 59 MMHG | WEIGHT: 199.6 LBS | SYSTOLIC BLOOD PRESSURE: 180 MMHG

## 2023-04-20 VITALS
HEART RATE: 89 BPM | OXYGEN SATURATION: 96 % | BODY MASS INDEX: 34.15 KG/M2 | TEMPERATURE: 97.7 F | HEIGHT: 64 IN | DIASTOLIC BLOOD PRESSURE: 62 MMHG | SYSTOLIC BLOOD PRESSURE: 150 MMHG | WEIGHT: 200 LBS

## 2023-04-20 DIAGNOSIS — D64.9 NORMOCYTIC ANEMIA: ICD-10-CM

## 2023-04-20 DIAGNOSIS — R91.8 PULMONARY NODULES: ICD-10-CM

## 2023-04-20 DIAGNOSIS — M60.9 MYOSITIS, UNSPECIFIED MYOSITIS TYPE, UNSPECIFIED SITE: Primary | ICD-10-CM

## 2023-04-20 DIAGNOSIS — R59.1 LYMPHADENOPATHY: ICD-10-CM

## 2023-04-20 DIAGNOSIS — R59.1 LYMPHADENOPATHY: Primary | ICD-10-CM

## 2023-04-20 LAB
ALBUMIN SERPL-MCNC: 3.6 G/DL (ref 3.5–5.2)
ALP SERPL-CCNC: 53 U/L (ref 35–104)
ALT SERPL-CCNC: 53 U/L (ref 0–32)
ANION GAP SERPL CALCULATED.3IONS-SCNC: 10 MMOL/L (ref 7–16)
AST SERPL-CCNC: 106 U/L (ref 0–31)
BASOPHILS # BLD: 0.03 E9/L (ref 0–0.2)
BASOPHILS NFR BLD: 0.7 % (ref 0–2)
BILIRUB SERPL-MCNC: 0.5 MG/DL (ref 0–1.2)
BUN SERPL-MCNC: 13 MG/DL (ref 6–23)
CALCIUM SERPL-MCNC: 9.2 MG/DL (ref 8.6–10.2)
CHLORIDE SERPL-SCNC: 104 MMOL/L (ref 98–107)
CO2 SERPL-SCNC: 25 MMOL/L (ref 22–29)
CREAT SERPL-MCNC: 0.7 MG/DL (ref 0.5–1)
EOSINOPHIL # BLD: 0.24 E9/L (ref 0.05–0.5)
EOSINOPHIL NFR BLD: 5.4 % (ref 0–6)
ERYTHROCYTE [DISTWIDTH] IN BLOOD BY AUTOMATED COUNT: 15 FL (ref 11.5–15)
FERRITIN SERPL-MCNC: 107 NG/ML
FOLATE SERPL-MCNC: >20 NG/ML (ref 4.8–24.2)
GLUCOSE SERPL-MCNC: 60 MG/DL (ref 74–99)
HCT VFR BLD AUTO: 36.5 % (ref 34–48)
HGB BLD-MCNC: 11.2 G/DL (ref 11.5–15.5)
IMM GRANULOCYTES # BLD: 0.01 E9/L
IMM GRANULOCYTES NFR BLD: 0.2 % (ref 0–5)
IRON SATN MFR SERPL: 14 % (ref 15–50)
IRON SERPL-MCNC: 35 MCG/DL (ref 37–145)
LDH SERPL-CCNC: 581 U/L (ref 135–214)
LYMPHOCYTES # BLD: 0.56 E9/L (ref 1.5–4)
LYMPHOCYTES NFR BLD: 12.7 % (ref 20–42)
MCH RBC QN AUTO: 27.3 PG (ref 26–35)
MCHC RBC AUTO-ENTMCNC: 30.7 % (ref 32–34.5)
MCV RBC AUTO: 88.8 FL (ref 80–99.9)
MONOCYTES # BLD: 0.5 E9/L (ref 0.1–0.95)
MONOCYTES NFR BLD: 11.3 % (ref 2–12)
NEUTROPHILS # BLD: 3.07 E9/L (ref 1.8–7.3)
NEUTS SEG NFR BLD: 69.7 % (ref 43–80)
OVALOCYTES: ABNORMAL
PATHOLOGIST REVIEW: NORMAL
PLATELET # BLD AUTO: 247 E9/L (ref 130–450)
PMV BLD AUTO: 10.3 FL (ref 7–12)
POIKILOCYTES: ABNORMAL
POTASSIUM SERPL-SCNC: 4.2 MMOL/L (ref 3.5–5)
PROT SERPL-MCNC: 6.7 G/DL (ref 6.4–8.3)
RBC # BLD AUTO: 4.11 E12/L (ref 3.5–5.5)
SODIUM SERPL-SCNC: 139 MMOL/L (ref 132–146)
TEAR DROP CELLS: ABNORMAL
TIBC SERPL-MCNC: 245 MCG/DL (ref 250–450)
URATE SERPL-MCNC: 7.4 MG/DL (ref 2.4–5.7)
VIT B12 SERPL-MCNC: 263 PG/ML (ref 211–946)
WBC # BLD: 4.4 E9/L (ref 4.5–11.5)

## 2023-04-20 PROCEDURE — 83615 LACTATE (LD) (LDH) ENZYME: CPT

## 2023-04-20 PROCEDURE — 99214 OFFICE O/P EST MOD 30 MIN: CPT

## 2023-04-20 PROCEDURE — 83550 IRON BINDING TEST: CPT

## 2023-04-20 PROCEDURE — G8400 PT W/DXA NO RESULTS DOC: HCPCS | Performed by: SURGERY

## 2023-04-20 PROCEDURE — 1036F TOBACCO NON-USER: CPT | Performed by: SURGERY

## 2023-04-20 PROCEDURE — 3017F COLORECTAL CA SCREEN DOC REV: CPT | Performed by: SURGERY

## 2023-04-20 PROCEDURE — G8400 PT W/DXA NO RESULTS DOC: HCPCS | Performed by: STUDENT IN AN ORGANIZED HEALTH CARE EDUCATION/TRAINING PROGRAM

## 2023-04-20 PROCEDURE — 82728 ASSAY OF FERRITIN: CPT

## 2023-04-20 PROCEDURE — 36415 COLL VENOUS BLD VENIPUNCTURE: CPT

## 2023-04-20 PROCEDURE — G8417 CALC BMI ABV UP PARAM F/U: HCPCS | Performed by: STUDENT IN AN ORGANIZED HEALTH CARE EDUCATION/TRAINING PROGRAM

## 2023-04-20 PROCEDURE — 99205 OFFICE O/P NEW HI 60 MIN: CPT | Performed by: STUDENT IN AN ORGANIZED HEALTH CARE EDUCATION/TRAINING PROGRAM

## 2023-04-20 PROCEDURE — 1090F PRES/ABSN URINE INCON ASSESS: CPT | Performed by: STUDENT IN AN ORGANIZED HEALTH CARE EDUCATION/TRAINING PROGRAM

## 2023-04-20 PROCEDURE — 3017F COLORECTAL CA SCREEN DOC REV: CPT | Performed by: STUDENT IN AN ORGANIZED HEALTH CARE EDUCATION/TRAINING PROGRAM

## 2023-04-20 PROCEDURE — 99204 OFFICE O/P NEW MOD 45 MIN: CPT | Performed by: SURGERY

## 2023-04-20 PROCEDURE — G8417 CALC BMI ABV UP PARAM F/U: HCPCS | Performed by: SURGERY

## 2023-04-20 PROCEDURE — 83883 ASSAY NEPHELOMETRY NOT SPEC: CPT

## 2023-04-20 PROCEDURE — 80053 COMPREHEN METABOLIC PANEL: CPT

## 2023-04-20 PROCEDURE — 84550 ASSAY OF BLOOD/URIC ACID: CPT

## 2023-04-20 PROCEDURE — 1090F PRES/ABSN URINE INCON ASSESS: CPT | Performed by: SURGERY

## 2023-04-20 PROCEDURE — 83540 ASSAY OF IRON: CPT

## 2023-04-20 PROCEDURE — G8427 DOCREV CUR MEDS BY ELIG CLIN: HCPCS | Performed by: STUDENT IN AN ORGANIZED HEALTH CARE EDUCATION/TRAINING PROGRAM

## 2023-04-20 PROCEDURE — 1036F TOBACCO NON-USER: CPT | Performed by: STUDENT IN AN ORGANIZED HEALTH CARE EDUCATION/TRAINING PROGRAM

## 2023-04-20 PROCEDURE — 1123F ACP DISCUSS/DSCN MKR DOCD: CPT | Performed by: SURGERY

## 2023-04-20 PROCEDURE — 1123F ACP DISCUSS/DSCN MKR DOCD: CPT | Performed by: STUDENT IN AN ORGANIZED HEALTH CARE EDUCATION/TRAINING PROGRAM

## 2023-04-20 PROCEDURE — 82746 ASSAY OF FOLIC ACID SERUM: CPT

## 2023-04-20 PROCEDURE — G8427 DOCREV CUR MEDS BY ELIG CLIN: HCPCS | Performed by: SURGERY

## 2023-04-20 PROCEDURE — 85025 COMPLETE CBC W/AUTO DIFF WBC: CPT

## 2023-04-20 PROCEDURE — 86703 HIV-1/HIV-2 1 RESULT ANTBDY: CPT

## 2023-04-20 PROCEDURE — 82607 VITAMIN B-12: CPT

## 2023-04-20 ASSESSMENT — ENCOUNTER SYMPTOMS
SHORTNESS OF BREATH: 0
BACK PAIN: 0
GASTROINTESTINAL NEGATIVE: 1
COUGH: 0

## 2023-04-20 NOTE — PROGRESS NOTES
Patient Bp is high will notify doctor and retake after doctor visit is over
Patient provided with discharge instructions. All questions answered. Patient understands follow up plan of care.
Negative. Musculoskeletal:  Negative for back pain and joint swelling. Skin: Negative. Neurological: Negative. Hematological:  Negative for adenopathy. Does not bruise/bleed easily. Psychiatric/Behavioral: Negative. Past Medical History:      Diagnosis Date    Diabetes ([address]     Hyperlipemia     Hypertension     MMT (medial meniscus tear)     RIGHT  KNEE  AND SCHEDULED FOR THE SURGERY ON 01/30/2018    Primary osteoarthritis of right knee     Thyroid disease      Patient Active Problem List   Diagnosis    Tear of medial meniscus of right knee, current    Primary osteoarthritis of right knee    Status post arthroscopy of right knee    Lightheadedness    Positive NOAH (antinuclear antibody)    Polyarthritis    Other fatigue    Elevated CK    Type 2 diabetes mellitus without complication, with long-term current use of insulin (HCC)    Polymyalgia (HCC)    Myositis associated antibody positive    Acquired hypothyroidism    Dermatomyositis ([address]    Lymphadenopathy    Pulmonary nodules        Past Surgical History:      Procedure Laterality Date    APPENDECTOMY      CHOLECYSTECTOMY      COLONOSCOPY      HYSTERECTOMY (CERVIX STATUS UNKNOWN)      KNEE ARTHROSCOPY Right 01/30/2018    TONSILLECTOMY         Family History:  Family History   Problem Relation Age of Onset    Cancer Mother         Cervical    Cancer Father         Leukemia    Cancer Sister         Thyroid       Medications:  Reviewed and reconciled. Current Outpatient Medications   Medication Sig Dispense Refill    JARDIANCE 25 MG tablet       furosemide (LASIX) 40 MG tablet as needed      lisinopril-hydroCHLOROthiazide (PRINZIDE;ZESTORETIC) 20-12.5 MG per tablet Take 1 tablet by mouth in the morning. 90 tablet 0    metoprolol tartrate (LOPRESSOR) 25 MG tablet Take 1 tablet by mouth in the morning and 1 tablet before bedtime.  (Patient taking differently: Take 2 tablets by mouth 2 times daily) 60 tablet 0    Multiple Vitamin (MULTIVITAMIN ADULT
or greater - 1       6. Medication: diuretics, strong analgesics, hypnotics, sedatives, antihypertensive agents   Yes - 3   7. Falls:  recent history of falls within the last 3 months (not to include slipping or tripping)   No - 0   TOTAL 4    If score of 4 or greater was education given? Yes       TABLE 2   Risk Score Risk Level Plan of Care   0-3 Little or  No Risk 1. Provide assistance as indicated for ambulation activities  2. Reorient confused/cognitively impaired patient  3. Call-light/bell within patient's reach  4. Chair/bed in low position, stretcher/bed with siderails up except when performing patient care activities  5. Educate patient/family/caregiver on falls prevention  6.  Reassess in 12 weeks or with any noted change in patient condition which places them at a risk for a fall   4-6 Moderate Risk 1. Provide assistance as indicated for ambulation activities  2. Reorient confused/cognitively impaired patient  3. Call-light/bell within patient's reach  4. Chair/bed in low position, stretcher/bed with siderails up except when performing patient care activities  5. Educate patient/family/caregiver on falls prevention  6. Falls risk precaution (Yellow sticker Level II) placed on patient chart   7 or   Higher High Risk 1. Place patient in easily observable treatment room  2. Patient attended at all times by family member or staff  3. Provide assistance as indicated for ambulation activities  4. Reorient confused/cognitively impaired patient  5. Call-light/bell within patient's reach  6. Chair/bed in low position, stretcher/bed with siderails up except when performing patient care activities  7. Educate patient/family/caregiver on falls prevention  8.   Falls risk precaution (Yellow sticker Level III) placed on patient chart           MALNUTRITION RISK SCREENING ASSESSMENT    Instructions:  Assess the patient and enter the appropriate indicators that are present for nutrition risk

## 2023-04-20 NOTE — TELEPHONE ENCOUNTER
Met with patient and her  during her initial consultation with Dr. Arnold Connors for lymphadenopathy. Introduced myself and explained my role with patients receiving treatment at our center. Patient was friendly and receptive. Completed nursing assessment for the center including medication review and medical, social, and surgical histories. 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 She was ordered a CT of the chest/abdomen/pelvis which revealed bilateral pulmonary nodules as well as scattered lymphadenopathy. She was referred to pulmonology and will see Dr. Efraín Simmons on 5/15. Patient was ordered labs and a PET scan by Dr. Arnold Connors today which I will schedule. We will see patient for follow up after she sees pulmonology. Discussed additional ancillary services available at the center. Patient admits to losing 40 pounds since November 2022. She attributes this to her muscle disease which makes it hard for her to swallow. Will refer to dietician. Provided with extensive written literature including Falls Prevention Sheet, Oncology prehab sheet, and PET scan instructions. Provided patient with my contact information, office hours, and encouragement to call me with questions or concerns. Patient verbalizes understanding and appreciative of nurse navigator visit. Emotional support provided and greater than 30 minutes spent with patient. Nurse navigator will continue to follow.  NINOSKA Gastelum, RN, Oncology Nurse Navigator

## 2023-04-20 NOTE — TELEPHONE ENCOUNTER
PET scan scheduled for patient at [facility] on 5/1 with a 9:30 AM arrival time. Patient has a list of the prep instructions. Called and LVM with appointment time. Left call back number if she has questions.

## 2023-04-21 LAB — HIV1+2 AB SERPL QL IA: NORMAL

## 2023-04-22 LAB
DEPRECATED KAPPA LC FREE/LAMBDA SER: 1.48 {RATIO} (ref 0.26–1.65)
KAPPA LC FREE SER-MCNC: 63.63 MG/L (ref 3.3–19.4)
LAMBDA LC FREE SERPL-MCNC: 42.92 MG/L (ref 5.71–26.3)

## 2023-04-24 ENCOUNTER — TELEPHONE (OUTPATIENT)
Dept: RHEUMATOLOGY | Age: 73
End: 2023-04-24

## 2023-04-24 ENCOUNTER — TELEPHONE (OUTPATIENT)
Dept: SURGERY | Age: 73
End: 2023-04-24

## 2023-04-24 ENCOUNTER — PREP FOR PROCEDURE (OUTPATIENT)
Dept: SURGERY | Age: 73
End: 2023-04-24

## 2023-04-24 RX ORDER — PREDNISONE 20 MG/1
60 TABLET ORAL DAILY
Qty: 90 TABLET | Refills: 1 | Status: SHIPPED | OUTPATIENT
Start: 2023-04-24

## 2023-04-24 NOTE — TELEPHONE ENCOUNTER
Patient called the office informing that she is scheduled for the biopsy on 05/01 with Dr. Lisa Kaufman. Patient was unsure if you wanted to prescribe the prednisone now that way she has it after the biopsy or wait until her biopsy is completed. Please advise.       Electronically signed by Slime Segovia CMA on 4/24/2023 at 3:02 PM

## 2023-04-24 NOTE — TELEPHONE ENCOUNTER
Breana Alex is scheduled for muscle biopsy with Dr Tess Agosto on 5/1 at 1245pm. Patient was told to arrive at 1045am. Patient needs to be NPO after midnight the night before procedure. All surgery instructions were explained to the patient and a surgery letter was also mailed out. MA informed patient that PAT will also be calling to review pre-op instructions and medications. Patient verbalized understanding.

## 2023-04-24 NOTE — TELEPHONE ENCOUNTER
Prior Authorization Form:      DEMOGRAPHICS:                     Patient Name:  Melissa Martinez  Patient :  1950            Insurance:  Payor: MEDICARE / Plan: MEDICARE PART A AND B / Product Type: *No Product type* /   Insurance ID Number:    Payer/Plan Subscr  Sex Relation Sub. Ins. ID Effective Group Num   1. MEDICARE - Estrellaon Tim* 1950 Female Self [id number]                                    [address] [address]   2.  [address] 1950 Female Self [id number]                                   [address]         DIAGNOSIS & PROCEDURE:                       Procedure/Operation: left upper arm muscle biopsy           CPT Code: 05570    Diagnosis:  myositis    ICD10 Code: q99.8    [facility]:  [facility]    Surgeon:  Bella Trevino INFORMATION:                          Date: 23    Time: 1245pm              Anesthesia:  General                                                       Status:  Outpatient        Special Comments:         Electronically signed by Elva Lee MA on 2023 at 2:51 PM

## 2023-04-25 NOTE — TELEPHONE ENCOUNTER
Patient is notified of this message and voiced understanding.     Electronically signed by Shay King on 4/25/2023 at 8:17 AM

## 2023-05-01 ENCOUNTER — HOSPITAL ENCOUNTER (OUTPATIENT)
Age: 73
Setting detail: OUTPATIENT SURGERY
Discharge: HOME | End: 2023-05-01
Attending: SURGERY | Admitting: SURGERY
Payer: MEDICARE

## 2023-05-01 ENCOUNTER — ANESTHESIA EVENT (OUTPATIENT)
Dept: OPERATING ROOM | Age: 73
End: 2023-05-01
Payer: MEDICARE

## 2023-05-01 ENCOUNTER — ANESTHESIA (OUTPATIENT)
Dept: OPERATING ROOM | Age: 73
End: 2023-05-01
Payer: MEDICARE

## 2023-05-01 VITALS
DIASTOLIC BLOOD PRESSURE: 79 MMHG | SYSTOLIC BLOOD PRESSURE: 189 MMHG | HEIGHT: 64 IN | HEART RATE: 58 BPM | WEIGHT: 193 LBS | RESPIRATION RATE: 20 BRPM | BODY MASS INDEX: 32.95 KG/M2 | OXYGEN SATURATION: 92 % | TEMPERATURE: 97.6 F

## 2023-05-01 DIAGNOSIS — M33.90 DERMATOMYOSITIS (HCC): Primary | ICD-10-CM

## 2023-05-01 DIAGNOSIS — Q99.8 MYOSITIS ASSOCIATED ANTIBODY POSITIVE: ICD-10-CM

## 2023-05-01 LAB
METER GLUCOSE: 76 MG/DL (ref 74–99)
METER GLUCOSE: 88 MG/DL (ref 74–99)

## 2023-05-01 PROCEDURE — 7100000001 HC PACU RECOVERY - ADDTL 15 MIN: Performed by: SURGERY

## 2023-05-01 PROCEDURE — 2709999900 HC NON-CHARGEABLE SUPPLY: Performed by: SURGERY

## 2023-05-01 PROCEDURE — 6360000002 HC RX W HCPCS: Performed by: SURGERY

## 2023-05-01 PROCEDURE — 2580000003 HC RX 258: Performed by: NURSE ANESTHETIST, CERTIFIED REGISTERED

## 2023-05-01 PROCEDURE — 2500000003 HC RX 250 WO HCPCS: Performed by: NURSE ANESTHETIST, CERTIFIED REGISTERED

## 2023-05-01 PROCEDURE — 82962 GLUCOSE BLOOD TEST: CPT

## 2023-05-01 PROCEDURE — 20205 DEEP MUSCLE BIOPSY: CPT | Performed by: SURGERY

## 2023-05-01 PROCEDURE — 7100000010 HC PHASE II RECOVERY - FIRST 15 MIN: Performed by: SURGERY

## 2023-05-01 PROCEDURE — 3600000002 HC SURGERY LEVEL 2 BASE: Performed by: SURGERY

## 2023-05-01 PROCEDURE — 7100000000 HC PACU RECOVERY - FIRST 15 MIN: Performed by: SURGERY

## 2023-05-01 PROCEDURE — 88305 TISSUE EXAM BY PATHOLOGIST: CPT

## 2023-05-01 PROCEDURE — 3700000001 HC ADD 15 MINUTES (ANESTHESIA): Performed by: SURGERY

## 2023-05-01 PROCEDURE — 6360000002 HC RX W HCPCS: Performed by: NURSE ANESTHETIST, CERTIFIED REGISTERED

## 2023-05-01 PROCEDURE — 7100000011 HC PHASE II RECOVERY - ADDTL 15 MIN: Performed by: SURGERY

## 2023-05-01 PROCEDURE — 2580000003 HC RX 258: Performed by: SURGERY

## 2023-05-01 PROCEDURE — 3700000000 HC ANESTHESIA ATTENDED CARE: Performed by: SURGERY

## 2023-05-01 PROCEDURE — 3600000012 HC SURGERY LEVEL 2 ADDTL 15MIN: Performed by: SURGERY

## 2023-05-01 PROCEDURE — 88300 SURGICAL PATH GROSS: CPT

## 2023-05-01 PROCEDURE — 2500000003 HC RX 250 WO HCPCS: Performed by: SURGERY

## 2023-05-01 RX ORDER — LIDOCAINE HYDROCHLORIDE 10 MG/ML
INJECTION, SOLUTION INFILTRATION; PERINEURAL PRN
Status: DISCONTINUED | OUTPATIENT
Start: 2023-05-01 | End: 2023-05-01 | Stop reason: ALTCHOICE

## 2023-05-01 RX ORDER — LIDOCAINE HYDROCHLORIDE 20 MG/ML
INJECTION, SOLUTION EPIDURAL; INFILTRATION; INTRACAUDAL; PERINEURAL PRN
Status: DISCONTINUED | OUTPATIENT
Start: 2023-05-01 | End: 2023-05-01 | Stop reason: SDUPTHER

## 2023-05-01 RX ORDER — PROPOFOL 10 MG/ML
INJECTION, EMULSION INTRAVENOUS PRN
Status: DISCONTINUED | OUTPATIENT
Start: 2023-05-01 | End: 2023-05-01 | Stop reason: SDUPTHER

## 2023-05-01 RX ORDER — SENNA AND DOCUSATE SODIUM 50; 8.6 MG/1; MG/1
2 TABLET, FILM COATED ORAL 2 TIMES DAILY
Qty: 28 TABLET | Refills: 0 | Status: SHIPPED | OUTPATIENT
Start: 2023-05-01

## 2023-05-01 RX ORDER — SODIUM CHLORIDE 9 MG/ML
INJECTION, SOLUTION INTRAVENOUS CONTINUOUS PRN
Status: DISCONTINUED | OUTPATIENT
Start: 2023-05-01 | End: 2023-05-01 | Stop reason: SDUPTHER

## 2023-05-01 RX ORDER — FENTANYL CITRATE 50 UG/ML
INJECTION, SOLUTION INTRAMUSCULAR; INTRAVENOUS PRN
Status: DISCONTINUED | OUTPATIENT
Start: 2023-05-01 | End: 2023-05-01 | Stop reason: SDUPTHER

## 2023-05-01 RX ORDER — OXYCODONE HYDROCHLORIDE 5 MG/1
5 TABLET ORAL EVERY 6 HOURS PRN
Qty: 20 TABLET | Refills: 0 | Status: SHIPPED | OUTPATIENT
Start: 2023-05-01 | End: 2023-05-06

## 2023-05-01 RX ADMIN — LIDOCAINE HYDROCHLORIDE 100 MG: 20 INJECTION, SOLUTION EPIDURAL; INFILTRATION; INTRACAUDAL; PERINEURAL at 12:36

## 2023-05-01 RX ADMIN — WATER 2000 MG: 1 INJECTION INTRAMUSCULAR; INTRAVENOUS; SUBCUTANEOUS at 12:28

## 2023-05-01 RX ADMIN — PROPOFOL 120 MG: 10 INJECTION, EMULSION INTRAVENOUS at 12:36

## 2023-05-01 RX ADMIN — SODIUM CHLORIDE: 9 INJECTION, SOLUTION INTRAVENOUS at 11:07

## 2023-05-01 RX ADMIN — FENTANYL CITRATE 50 MCG: 50 INJECTION, SOLUTION INTRAMUSCULAR; INTRAVENOUS at 12:36

## 2023-05-01 RX ADMIN — FENTANYL CITRATE 50 MCG: 50 INJECTION, SOLUTION INTRAMUSCULAR; INTRAVENOUS at 12:43

## 2023-05-01 ASSESSMENT — PAIN - FUNCTIONAL ASSESSMENT: PAIN_FUNCTIONAL_ASSESSMENT: 0-10

## 2023-05-01 ASSESSMENT — PAIN SCALES - GENERAL: PAINLEVEL_OUTOF10: 0

## 2023-05-01 NOTE — ANESTHESIA POSTPROCEDURE EVALUATION
Department of Anesthesiology  Postprocedure Note    Patient: Branden Martini  MRN: [id number]  YOB: 1950  Date of evaluation: 5/1/2023      Procedure Summary     Date: 05/01/23 Room / Location: [facility] OR 10 / [facility]    Anesthesia Start: 5203 Anesthesia Stop:     Procedure: MUSCLE BIOPSY LEFT UPPER ARM (Left: Arm Upper) Diagnosis:       Myositis associated antibody positive      (Myositis associated antibody positive [Q99.8])    Surgeons: Jj Wolf MD Responsible Provider: Samy Ziegler MD    Anesthesia Type: general ASA Status: 3          Anesthesia Type: No value filed.     Rosalino Phase I: Rosalino Score: 10    Rosalino Phase II:        Anesthesia Post Evaluation    Patient location during evaluation: PACU  Patient participation: complete - patient participated  Level of consciousness: awake  Airway patency: patent  Nausea & Vomiting: no nausea and no vomiting  Complications: no  Cardiovascular status: hemodynamically stable  Respiratory status: acceptable  Hydration status: euvolemic

## 2023-05-01 NOTE — DISCHARGE INSTRUCTIONS
[address] may be drowsy or lightheaded after receiving sedation or anesthesia. A responsible person should be with you for the next 24 hours. Please follow the instructions checked below:    DIET INSTRUCTIONS:  [x]Start with light diet and progress to your normal diet as you feel like eating. If you experience nausea or repeated episodes of vomiting which persist beyond 12-24 hours, notify your doctor. []Other     ACTIVITY INSTRUCTIONS:  [x]Rest today. Increase activity as tolerated    [x]No heavy lifting or strenuous activity for 2 weeks   [x]No driving for 1 day or while on narcotics  []Other     WOUND/DRESSING INSTRUCTIONS:  Always ensure you and your care giver clean hands before and after caring for the wound. [x]May shower      []May bathe      [x]Derma bond dressing-Do not apply lotion, gel, or liquid to wound while the derma bond is in place. []Other         MEDICATION INSTRUCTIONS:    [x]Prescriptions sent with you. Use as directed. When taking pain medications, you may experience dizziness or drowsiness. Do not drink alcohol or drive when taking these medications. [x]You may take a non-prescription headache remedy, preferably one that does not contain aspirin. Other Instructions:      FOLLOW-UP CARE:  [x]Call the office for follow-up appointment as needed    Call physician if they or any other problems occur:  Fever over 101°    Redness, swelling, hardness or warmth at the operative site  Unrelieved nausea     Foul smelling or cloudy drainage at the operative site   Unrelieved pain    Blood soaked dressing.  (Some oozing may be normal)

## 2023-05-01 NOTE — H&P
Update History & Physical    The patient's History and Physical of April 20, 2023 was reviewed with the patient and I examined the patient. There was no change. The surgical site was confirmed by the patient and me. Plan: The risks, benefits, expected outcome, and alternative to the recommended procedure have been discussed with the patient. Patient understands and wants to proceed with the procedure. Electronically signed by Taylor Aragon MD on 5/1/2023 at 12:13 PM      [address] [employer] Note     Assessment/Plan:        Diagnosis Orders   1. Myositis, unspecified myositis type, unspecified site        We will plan for muscle biopsy, possible left upper extremity, possible left thigh                Return for Surgery. Chief Complaint   Patient presents with    New Patient       Dernatomyositis         PCP: Gypsy Thomas DO     HPI: Rose Cesar is a 67 y.o. female who presents in consultation for myositis. She has seen rheumatology who is requesting a muscle biopsy. She says it is related to some sort of immune deficiency that they believe is muscle related. She has been having progressive weakness since November. It is more in the proximal muscle groups but most significantly in the left upper extremity. She complains of associated fatigue as well.   Case is discussed with Dr. Romario Gaviria        Past Medical History        Past Medical History:   Diagnosis Date    Diabetes ([address]      Hyperlipemia      Hypertension      MMT (medial meniscus tear)       RIGHT  KNEE  AND SCHEDULED FOR THE SURGERY ON 01/30/2018    Primary osteoarthritis of right knee      Thyroid disease              Past Surgical History         Past Surgical History:   Procedure Laterality Date    APPENDECTOMY        CHOLECYSTECTOMY        COLONOSCOPY        HYSTERECTOMY (CERVIX STATUS UNKNOWN)        KNEE ARTHROSCOPY Right 01/30/2018    TONSILLECTOMY                Home Medications           Prior to

## 2023-05-01 NOTE — ANESTHESIA POSTPROCEDURE EVALUATION
Department of Anesthesiology  Postprocedure Note    Patient: Jimi Gamboa  MRN: [id number]  YOB: 1950  Date of evaluation: 5/1/2023      Procedure Summary     Date: 05/01/23 Room / Location: [facility] OR 10 / [facility]    Anesthesia Start: 7221 Anesthesia Stop:     Procedure: MUSCLE BIOPSY LEFT UPPER ARM (Left: Arm Upper) Diagnosis:       Myositis associated antibody positive      (Myositis associated antibody positive [Q99.8])    Surgeons: Agustin Flores MD Responsible Provider: Jany Pink MD    Anesthesia Type: general ASA Status: 3          Anesthesia Type: No value filed.     Rosalino Phase I: Rosalino Score: 8    Rosalino Phase II:        Anesthesia Post Evaluation    Patient location during evaluation: PACU  Patient participation: complete - patient participated  Level of consciousness: awake  Pain score: 3  Airway patency: patent  Nausea & Vomiting: no nausea and no vomiting  Complications: no  Cardiovascular status: blood pressure returned to baseline  Respiratory status: acceptable  Hydration status: euvolemic

## 2023-05-01 NOTE — ANESTHESIA PRE PROCEDURE
Department of Anesthesiology  Preprocedure Note       Name:  Parviz Puckett   Age:  67 y.o.  :  1950                                          MRN:  [id number]         Date:  2023      Surgeon: Christine Hayes):  Gilbert Roper MD    Procedure: Procedure(s): MUSCLE BIOPSY LEFT UPPER ARM    Medications prior to admission:   Prior to Admission medications    Medication Sig Start Date End Date Taking? Authorizing Provider   predniSONE (DELTASONE) 20 MG tablet Take 3 tablets by mouth daily 23   Georgie Tello DO   JARDIANCE 25 MG tablet 1 tablet daily LD 22   Historical Provider, MD   furosemide (LASIX) 40 MG tablet as needed 23   Historical Provider, MD   lisinopril-hydroCHLOROthiazide (PRINZIDE;ZESTORETIC) 20-12.5 MG per tablet Take 1 tablet by mouth in the morning. 22   Maeve Galdamez MD   metoprolol tartrate (LOPRESSOR) 25 MG tablet Take 1 tablet by mouth in the morning and 1 tablet before bedtime.   Patient taking differently: Take 2 tablets by mouth 2 times daily 22   Maeve Galdamez MD   Multiple Vitamin (MULTIVITAMIN ADULT PO) Take by mouth    Historical Provider, MD   hydrochlorothiazide (MICROZIDE) 12.5 MG capsule Take 12.5 mg by mouth daily  Patient not taking: Reported on 2022  Historical Provider, MD   Levothyroxine Sodium 200 MCG CAPS daily 9/15/17   Historical Provider, MD Sharlynn Severin 100 UNIT/ML injection pen 34 Units nightly 17   Historical Provider, MD   enalapril (VASOTEC) 20 MG tablet  17  Historical Provider, MD       Current medications:    Current Facility-Administered Medications   Medication Dose Route Frequency Provider Last Rate Last Admin    ceFAZolin (ANCEF) 2,000 mg in sterile water 20 mL IV syringe  2,000 mg IntraVENous On Call to [address] [place], [place] [address]o Street, MD         Facility-Administered Medications Ordered in Other Encounters   Medication Dose Route Frequency Provider Last Rate Last Admin    0.9 % sodium chloride

## 2023-05-01 NOTE — OP NOTE
Operative Note      Patient: Georgie Tomas  YOB: 1950  MRN: [id number]    Date of Procedure: 5/1/2023    Pre-Op Diagnosis Codes: * Myositis associated antibody positive [Q99.8]    Post-Op Diagnosis: Same       Procedure(s): MUSCLE BIOPSY LEFT UPPER ARM    Surgeon(s):  Quincy Boxer, MD    Assistant:   Resident: Kieran Tate MD; Vandana Solis MD    Anesthesia: Monitor Anesthesia Care    Estimated Blood Loss (mL): Minimal    Complications: None    Specimens:   ID Type Source Tests Collected by Time Destination   A : LEFT UPPER ARM MUSCLE BIOPSY Tissue Tissue SURGICAL PATHOLOGY Quincy Boxer, MD 5/1/2023 1258        Implants:  * No implants in log *      Drains: * No LDAs found *    Findings: 1.5-2cm muscle biopsy specimen      Detailed Description of Procedure:    I have explained the risks, benefits, alternatives, and potential complications associated with the proposed procedure to be performed and other issues when applicable with the patient/responsible party prior to the procedure. All of their questions were answered as appropriate and to their satisfaction. They understand and agree to the procedure. The patient was brought to the operating room and positioned supine on the operating room table. Sequential compression devices were placed on the patient's lower extremities and were powered on. General anesthesia was administered and preoperative antibiotics were administered per the anesthetic record. The patient was prepped and draped in the usual sterile fashion and the procedure went forth with strict aseptic technique under maximal barrier precautions. Immediately prior to the procedure a time-out was called and the surgical checklist was reviewed and agreed upon by all present. The left upper arm was prepped and draped usual fashion.   An incision overlying the triceps was made with a 15 scalpel blade and electrocautery was used to come through the dermis and the subcu

## 2023-05-03 ENCOUNTER — HOSPITAL ENCOUNTER (OUTPATIENT)
Dept: PET IMAGING | Age: 73
Discharge: HOME | End: 2023-05-05
Payer: MEDICARE

## 2023-05-03 DIAGNOSIS — R59.1 LYMPHADENOPATHY: ICD-10-CM

## 2023-05-03 DIAGNOSIS — D64.9 NORMOCYTIC ANEMIA: ICD-10-CM

## 2023-05-03 DIAGNOSIS — R91.8 PULMONARY NODULES: ICD-10-CM

## 2023-05-03 LAB — METER GLUCOSE: 145 MG/DL (ref 74–99)

## 2023-05-03 PROCEDURE — 3430000000 HC RX DIAGNOSTIC RADIOPHARMACEUTICAL: Performed by: RADIOLOGY

## 2023-05-03 PROCEDURE — 82962 GLUCOSE BLOOD TEST: CPT

## 2023-05-03 PROCEDURE — A9552 F18 FDG: HCPCS | Performed by: RADIOLOGY

## 2023-05-03 PROCEDURE — 78815 PET IMAGE W/CT SKULL-THIGH: CPT

## 2023-05-03 RX ORDER — FLUDEOXYGLUCOSE F 18 200 MCI/ML
15 INJECTION, SOLUTION INTRAVENOUS
Status: COMPLETED | OUTPATIENT
Start: 2023-05-03 | End: 2023-05-03

## 2023-05-03 RX ADMIN — FLUDEOXYGLUCOSE F 18 15 MILLICURIE: 200 INJECTION, SOLUTION INTRAVENOUS at 13:17

## 2023-05-05 ENCOUNTER — TELEPHONE (OUTPATIENT)
Dept: INFUSION THERAPY | Age: 73
End: 2023-05-05

## 2023-05-05 DIAGNOSIS — K76.9 LIVER LESION: Primary | ICD-10-CM

## 2023-05-05 DIAGNOSIS — M89.9 LESION OF THORACIC VERTEBRA: ICD-10-CM

## 2023-05-05 NOTE — TELEPHONE ENCOUNTER
Continue with Prilosec 40mg  You may use OTC tums as needed  Recommend small frequent meals throughout the day  Avoid aggravating foods - spicy foods, acidic foods - such as tomato and citrus  Avoid alcohol  Do not lay down for at least 30 mins after eating  I reviewed PET scan results, noted findings of concern, some of which were not found on previous CT's. I disclosed results to the patient. Recommended MR of abdomen/liver and MR of T-spine. I also offered biopsy  as well, and after discussion, she is agreeable. Pt is requesting to see if possible if these can be done on the same day as she lives in the [place] area. New orders placed.

## 2023-05-08 ENCOUNTER — TELEPHONE (OUTPATIENT)
Dept: INFUSION THERAPY | Age: 73
End: 2023-05-08

## 2023-05-08 DIAGNOSIS — R53.83 OTHER FATIGUE: ICD-10-CM

## 2023-05-08 DIAGNOSIS — Z51.81 ENCOUNTER FOR THERAPEUTIC DRUG LEVEL MONITORING: ICD-10-CM

## 2023-05-08 DIAGNOSIS — R42 LIGHTHEADEDNESS: Primary | ICD-10-CM

## 2023-05-08 DIAGNOSIS — R59.1 LYMPHADENOPATHY: ICD-10-CM

## 2023-05-08 NOTE — TELEPHONE ENCOUNTER
Parvin Belvidere  5/8/2023  Wt Readings from Last 10 Encounters:   05/01/23 193 lb (87.5 kg)   04/20/23 200 lb (90.7 kg)   04/20/23 199 lb 9.6 oz (90.5 kg)   04/13/23 198 lb (89.8 kg)   03/03/23 210 lb (95.3 kg)   02/27/23 210 lb (95.3 kg)   01/23/23 210 lb (95.3 kg)   07/30/22 230 lb (104.3 kg)   07/30/22 230 lb (104.3 kg)   07/30/22 230 lb (104.3 kg)     Ht Readings from Last 1 Encounters:   05/01/23 5' 4\" (1.626 m)     BMI: 33.13     Assessment: Contacted pt per referral for wt loss over last several months. Pt w/ lung nodules, lymphadenopathy. Pt admits to dysphagia, following w/ rheumatology for polymyalgia. Pt admits she has not been seen by SLP. She is uninterested in this at this time. Discussed current food choices. Pt consumes smaller meals w/ soft protein sources. Pt is not currently utilizing any ONS products. Per measured wt 4/20/23, pt has lost 31# x9 months (13.5%). Discussed use of ONS to help meet nutrition needs w/ current soft PO intake. Pt somewhat resistant to discussion w/ this clinician at this time. She denies any current needs regarding nutrition at this time. Encouraged to contact this clinician as needed.     Recommendations: Pt to consume 6 small soft meals/day + ONS of choice daily as tolerated    Ema Murray, MS, RD, LD

## 2023-05-10 ENCOUNTER — TELEPHONE (OUTPATIENT)
Dept: CASE MANAGEMENT | Age: 73
End: 2023-05-10

## 2023-05-10 NOTE — TELEPHONE ENCOUNTER
Spoke with IR scheduling regarding patient's biopsy. Order is still in review with the radiologist and they state they are hoping to be able to call the patient tomorrow to schedule. Additionally, the patient is requesting that her orders scans be performed on the same day as she lives far away. Once she has a biopsy date, I will attempt to schedule her scans on the same day.

## 2023-05-11 ENCOUNTER — OFFICE VISIT (OUTPATIENT)
Dept: RHEUMATOLOGY | Age: 73
End: 2023-05-11
Payer: MEDICARE

## 2023-05-11 ENCOUNTER — TELEPHONE (OUTPATIENT)
Dept: CT IMAGING | Age: 73
End: 2023-05-11

## 2023-05-11 VITALS — HEIGHT: 64 IN | WEIGHT: 189 LBS | BODY MASS INDEX: 32.27 KG/M2

## 2023-05-11 DIAGNOSIS — D84.9 IMMUNOSUPPRESSION (HCC): ICD-10-CM

## 2023-05-11 DIAGNOSIS — M81.8 CORTICOSTEROID-INDUCED OSTEOPOROSIS: ICD-10-CM

## 2023-05-11 DIAGNOSIS — Z79.899 HIGH RISK MEDICATION USE: ICD-10-CM

## 2023-05-11 DIAGNOSIS — T38.0X5A CORTICOSTEROID-INDUCED OSTEOPOROSIS: ICD-10-CM

## 2023-05-11 DIAGNOSIS — Z79.4 TYPE 2 DIABETES MELLITUS WITHOUT COMPLICATION, WITH LONG-TERM CURRENT USE OF INSULIN (HCC): ICD-10-CM

## 2023-05-11 DIAGNOSIS — E03.9 ACQUIRED HYPOTHYROIDISM: ICD-10-CM

## 2023-05-11 DIAGNOSIS — E11.9 TYPE 2 DIABETES MELLITUS WITHOUT COMPLICATION, WITH LONG-TERM CURRENT USE OF INSULIN (HCC): ICD-10-CM

## 2023-05-11 DIAGNOSIS — I10 ESSENTIAL HYPERTENSION: ICD-10-CM

## 2023-05-11 DIAGNOSIS — D89.89 ANTISYNTHETASE SYNDROME (HCC): Primary | ICD-10-CM

## 2023-05-11 DIAGNOSIS — D89.89 ANTISYNTHETASE SYNDROME (HCC): ICD-10-CM

## 2023-05-11 LAB
CK SERPL-CCNC: 902 U/L (ref 20–180)
CRP SERPL HS-MCNC: 0.8 MG/DL (ref 0–0.4)
ERYTHROCYTE [SEDIMENTATION RATE] IN BLOOD BY WESTERGREN METHOD: 17 MM/HR (ref 0–20)
VITAMIN D 25-HYDROXY: 23 NG/ML (ref 30–100)

## 2023-05-11 PROCEDURE — G8400 PT W/DXA NO RESULTS DOC: HCPCS | Performed by: INTERNAL MEDICINE

## 2023-05-11 PROCEDURE — 3017F COLORECTAL CA SCREEN DOC REV: CPT | Performed by: INTERNAL MEDICINE

## 2023-05-11 PROCEDURE — G8427 DOCREV CUR MEDS BY ELIG CLIN: HCPCS | Performed by: INTERNAL MEDICINE

## 2023-05-11 PROCEDURE — 3046F HEMOGLOBIN A1C LEVEL >9.0%: CPT | Performed by: INTERNAL MEDICINE

## 2023-05-11 PROCEDURE — 1123F ACP DISCUSS/DSCN MKR DOCD: CPT | Performed by: INTERNAL MEDICINE

## 2023-05-11 PROCEDURE — 1036F TOBACCO NON-USER: CPT | Performed by: INTERNAL MEDICINE

## 2023-05-11 PROCEDURE — G8417 CALC BMI ABV UP PARAM F/U: HCPCS | Performed by: INTERNAL MEDICINE

## 2023-05-11 PROCEDURE — 99215 OFFICE O/P EST HI 40 MIN: CPT | Performed by: INTERNAL MEDICINE

## 2023-05-11 PROCEDURE — 1090F PRES/ABSN URINE INCON ASSESS: CPT | Performed by: INTERNAL MEDICINE

## 2023-05-11 PROCEDURE — 2022F DILAT RTA XM EVC RTNOPTHY: CPT | Performed by: INTERNAL MEDICINE

## 2023-05-11 RX ORDER — OMEPRAZOLE 40 MG/1
40 CAPSULE, DELAYED RELEASE ORAL
Qty: 30 CAPSULE | Refills: 5 | Status: SHIPPED | OUTPATIENT
Start: 2023-05-11

## 2023-05-11 ASSESSMENT — ENCOUNTER SYMPTOMS
VOMITING: 0
ABDOMINAL PAIN: 0
TROUBLE SWALLOWING: 1
NAUSEA: 0
COLOR CHANGE: 1
COUGH: 0
SHORTNESS OF BREATH: 0
DIARRHEA: 0

## 2023-05-11 NOTE — PATIENT INSTRUCTIONS
Stay on 60mg of prednisone until I see you back    Take Prilosec one tab daily    Have bone density scan

## 2023-05-11 NOTE — PROGRESS NOTES
Zee Laboy 1950 is a 67 y.o. female, here for evaluation of the following chief complaint(s):  Follow-up (Patient here for follow up after biopsy. )         ASSESSMENT/PLAN:    Zee Laboy 1950 is a 67 y.o. female seen in follow-up for antisynthetase syndrome. 1.  Antisynthetase syndrome-biopsy confirmed, manifest as proximal weakness with elevated CK in the 2000 range, Gottron's papules, shawl rash which is since resolved, NOAH which is since turned negative, esophageal dysmotility, weakly positive OJ antibody. 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 EMG of the right upper extremity was normal.  We have her on 60 mg of prednisone daily and she is starting to feel better but does still have weakness. The other concerning issue is her PET scan showed several areas concerning for metastatic disease. 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 She is in the process of being scheduled for biopsy. If she proves to have an underlying malignancy obviously that will need to be treated. For now I will keep her on 60 mg of prednisone daily and plan on tapering starting next visit. We will hold off on IVIG for right now pending biopsy results. We will update labs as below. 2.  Rwfxloegbeesstjgz-gy-qm-date on hepatitis and TB. Recommend she stay up-to-date on vaccinations but cannot get any vaccinations on high-dose prednisone. 3.  High risk medication use-we will put her on Prilosec daily for stomach protection. We will need check a bone density scan and consider treatment for GI OP but will hold off for right now given concern for possible bone metastasis. We will monitor for infection. 4.  Type 2 diabetes-we will need to monitor blood sugar closely on high-dose prednisone. May need to discuss insulin adjustment with her PCP.     5.

## 2023-05-11 NOTE — TELEPHONE ENCOUNTER
5/11/2023 PER DR DAS THIS PATIENT IS TO BE REFERRED TO A SURGEON. DR Davey Del Toro SPOKE TO DR Nayla Willoughby REGARDING HIS OPINION ON THIS BIOPSY.

## 2023-05-12 ENCOUNTER — TELEPHONE (OUTPATIENT)
Dept: ONCOLOGY | Age: 73
End: 2023-05-12

## 2023-05-12 DIAGNOSIS — R59.1 LYMPHADENOPATHY: Primary | ICD-10-CM

## 2023-05-12 NOTE — TELEPHONE ENCOUNTER
Due to an error in MRI, patient's appointments were changed to 5/15 @ [phone] for MRI and [phone] with Dr. Radha Hopson.

## 2023-05-14 LAB — ALDOLASE SERPL-CCNC: 18.1 U/L (ref 1.2–7.6)

## 2023-05-15 ENCOUNTER — HOSPITAL ENCOUNTER (OUTPATIENT)
Dept: MRI IMAGING | Age: 73
Discharge: HOME | End: 2023-05-17
Payer: MEDICARE

## 2023-05-15 ENCOUNTER — OFFICE VISIT (OUTPATIENT)
Dept: PULMONOLOGY | Age: 73
End: 2023-05-15
Payer: MEDICARE

## 2023-05-15 ENCOUNTER — OFFICE VISIT (OUTPATIENT)
Dept: ONCOLOGY | Age: 73
End: 2023-05-15
Payer: MEDICARE

## 2023-05-15 ENCOUNTER — HOSPITAL ENCOUNTER (OUTPATIENT)
Dept: INFUSION THERAPY | Age: 73
Discharge: HOME | End: 2023-05-15

## 2023-05-15 VITALS
HEART RATE: 63 BPM | WEIGHT: 188 LBS | TEMPERATURE: 97.6 F | BODY MASS INDEX: 32.1 KG/M2 | RESPIRATION RATE: 18 BRPM | DIASTOLIC BLOOD PRESSURE: 61 MMHG | SYSTOLIC BLOOD PRESSURE: 194 MMHG | OXYGEN SATURATION: 98 % | HEIGHT: 64 IN

## 2023-05-15 VITALS
TEMPERATURE: 97.5 F | WEIGHT: 188 LBS | DIASTOLIC BLOOD PRESSURE: 50 MMHG | OXYGEN SATURATION: 100 % | SYSTOLIC BLOOD PRESSURE: 177 MMHG | HEART RATE: 65 BPM | HEIGHT: 64 IN | BODY MASS INDEX: 32.1 KG/M2

## 2023-05-15 DIAGNOSIS — D89.89 ANTISYNTHETASE SYNDROME (HCC): ICD-10-CM

## 2023-05-15 DIAGNOSIS — R91.8 PULMONARY NODULES: Primary | ICD-10-CM

## 2023-05-15 DIAGNOSIS — K76.9 LIVER LESION: ICD-10-CM

## 2023-05-15 DIAGNOSIS — R59.1 LYMPHADENOPATHY: Primary | ICD-10-CM

## 2023-05-15 DIAGNOSIS — M89.9 BONE LESION: ICD-10-CM

## 2023-05-15 DIAGNOSIS — M89.9 LESION OF THORACIC VERTEBRA: ICD-10-CM

## 2023-05-15 PROCEDURE — 99203 OFFICE O/P NEW LOW 30 MIN: CPT | Performed by: INTERNAL MEDICINE

## 2023-05-15 PROCEDURE — 1036F TOBACCO NON-USER: CPT | Performed by: INTERNAL MEDICINE

## 2023-05-15 PROCEDURE — 3017F COLORECTAL CA SCREEN DOC REV: CPT | Performed by: INTERNAL MEDICINE

## 2023-05-15 PROCEDURE — 1090F PRES/ABSN URINE INCON ASSESS: CPT | Performed by: INTERNAL MEDICINE

## 2023-05-15 PROCEDURE — G8400 PT W/DXA NO RESULTS DOC: HCPCS | Performed by: STUDENT IN AN ORGANIZED HEALTH CARE EDUCATION/TRAINING PROGRAM

## 2023-05-15 PROCEDURE — G8417 CALC BMI ABV UP PARAM F/U: HCPCS | Performed by: INTERNAL MEDICINE

## 2023-05-15 PROCEDURE — A9577 INJ MULTIHANCE: HCPCS | Performed by: RADIOLOGY

## 2023-05-15 PROCEDURE — 1123F ACP DISCUSS/DSCN MKR DOCD: CPT | Performed by: STUDENT IN AN ORGANIZED HEALTH CARE EDUCATION/TRAINING PROGRAM

## 2023-05-15 PROCEDURE — 1090F PRES/ABSN URINE INCON ASSESS: CPT | Performed by: STUDENT IN AN ORGANIZED HEALTH CARE EDUCATION/TRAINING PROGRAM

## 2023-05-15 PROCEDURE — 3017F COLORECTAL CA SCREEN DOC REV: CPT | Performed by: STUDENT IN AN ORGANIZED HEALTH CARE EDUCATION/TRAINING PROGRAM

## 2023-05-15 PROCEDURE — 72157 MRI CHEST SPINE W/O & W/DYE: CPT

## 2023-05-15 PROCEDURE — G8427 DOCREV CUR MEDS BY ELIG CLIN: HCPCS | Performed by: STUDENT IN AN ORGANIZED HEALTH CARE EDUCATION/TRAINING PROGRAM

## 2023-05-15 PROCEDURE — 1036F TOBACCO NON-USER: CPT | Performed by: STUDENT IN AN ORGANIZED HEALTH CARE EDUCATION/TRAINING PROGRAM

## 2023-05-15 PROCEDURE — 99214 OFFICE O/P EST MOD 30 MIN: CPT | Performed by: STUDENT IN AN ORGANIZED HEALTH CARE EDUCATION/TRAINING PROGRAM

## 2023-05-15 PROCEDURE — 99212 OFFICE O/P EST SF 10 MIN: CPT

## 2023-05-15 PROCEDURE — 6360000004 HC RX CONTRAST MEDICATION: Performed by: RADIOLOGY

## 2023-05-15 PROCEDURE — 1123F ACP DISCUSS/DSCN MKR DOCD: CPT | Performed by: INTERNAL MEDICINE

## 2023-05-15 PROCEDURE — 3074F SYST BP LT 130 MM HG: CPT | Performed by: INTERNAL MEDICINE

## 2023-05-15 PROCEDURE — 3077F SYST BP >= 140 MM HG: CPT | Performed by: STUDENT IN AN ORGANIZED HEALTH CARE EDUCATION/TRAINING PROGRAM

## 2023-05-15 PROCEDURE — 3078F DIAST BP <80 MM HG: CPT | Performed by: INTERNAL MEDICINE

## 2023-05-15 PROCEDURE — G8428 CUR MEDS NOT DOCUMENT: HCPCS | Performed by: INTERNAL MEDICINE

## 2023-05-15 PROCEDURE — G8399 PT W/DXA RESULTS DOCUMENT: HCPCS | Performed by: INTERNAL MEDICINE

## 2023-05-15 PROCEDURE — 74183 MRI ABD W/O CNTR FLWD CNTR: CPT

## 2023-05-15 PROCEDURE — 3078F DIAST BP <80 MM HG: CPT | Performed by: STUDENT IN AN ORGANIZED HEALTH CARE EDUCATION/TRAINING PROGRAM

## 2023-05-15 PROCEDURE — G8417 CALC BMI ABV UP PARAM F/U: HCPCS | Performed by: STUDENT IN AN ORGANIZED HEALTH CARE EDUCATION/TRAINING PROGRAM

## 2023-05-15 RX ADMIN — GADOBENATE DIMEGLUMINE 17 ML: 529 INJECTION, SOLUTION INTRAVENOUS at 13:08

## 2023-05-15 ASSESSMENT — ENCOUNTER SYMPTOMS
BACK PAIN: 0
GASTROINTESTINAL NEGATIVE: 1
SHORTNESS OF BREATH: 0
COUGH: 0

## 2023-05-15 NOTE — PROGRESS NOTES
[address] [facility] MEDICAL ONCOLOGY  [address] [place] [address]  [place] [address]  Dept: [phone]  Loc: [phone]  Clinic Progress Note    Referring Provider:  Ori Young DO    Reason for Visit:   Lymphadenopathy    PCP:  Jina López DO    Demographics: 67 y.o. female    Chief Complaint:   Chief Complaint   Patient presents with    Follow-up       Subjective:  She was recently started on steroids by rheumatology. She had undergone muscle biopsy recently. We are here to discuss further management moving forward. She was able to see pulmonology earlier this morning. And also was able to do MRIs of the abdomen and T-spine. Otherwise the patient reports that she denies of any rash. And also still unable to raise her arms above her head. HPI from Initial Outpatient Consultation  (4/20/23): The patient is a 67 y.o. female comes in due to findings of lymphadenopathy on CT scans. The patient follows rheumatology with Dr. Fidencio Spears, and currently being worked up for myositis. The patient reports having some symptoms, including not limited to a 40 pound weight loss since November 2022, and has been struggling with dysphagia/choking, which she was noted to be associate with her rheumatologic diagnosis. Further rheumatologic evaluation included CT scans, in which there was adenopathy involving the chest, abdomen, groin/pelvis. There was also noted indeterminate lucent versus potential lytic lesions involving the lower spine and pelvis. Patient was subsequently referred to our service for further evaluation and care. Today, patient was accompanied by her . She does have fatigue. Otherwise denies of any bleed. She reports her last colonoscopy was back in 2019, and her last mammogram was back in October 2022. It has been several years since her last Pap smear. Otherwise she denies of significant bony pains.   She has difficulty raising

## 2023-05-16 ENCOUNTER — TELEPHONE (OUTPATIENT)
Dept: ONCOLOGY | Age: 73
End: 2023-05-16

## 2023-05-16 DIAGNOSIS — R93.7 ABNORMAL MRI, THORACIC SPINE: Primary | ICD-10-CM

## 2023-05-16 NOTE — TELEPHONE ENCOUNTER
I called patient and disclosed results of the MRI's. There remains findings of the T5 lesion. Last week, IR noted lesion would be difficult to biopsy. I reached out to neurosurgery outlining the case. Patient denies of alarming neurological symptoms and states she feels much better since starting steroids. Referral placed for neurosurgery, and I explained reasoning for referral to the patient.

## 2023-05-23 ENCOUNTER — OFFICE VISIT (OUTPATIENT)
Dept: NEUROSURGERY | Age: 73
End: 2023-05-23
Payer: MEDICARE

## 2023-05-23 VITALS
HEIGHT: 64 IN | OXYGEN SATURATION: 97 % | RESPIRATION RATE: 16 BRPM | BODY MASS INDEX: 32.1 KG/M2 | WEIGHT: 188 LBS | DIASTOLIC BLOOD PRESSURE: 65 MMHG | SYSTOLIC BLOOD PRESSURE: 210 MMHG | HEART RATE: 62 BPM

## 2023-05-23 DIAGNOSIS — M48.9 MASS OF THORACIC VERTEBRA: Primary | ICD-10-CM

## 2023-05-23 PROCEDURE — 1090F PRES/ABSN URINE INCON ASSESS: CPT | Performed by: NEUROLOGICAL SURGERY

## 2023-05-23 PROCEDURE — 1123F ACP DISCUSS/DSCN MKR DOCD: CPT | Performed by: NEUROLOGICAL SURGERY

## 2023-05-23 PROCEDURE — G8427 DOCREV CUR MEDS BY ELIG CLIN: HCPCS | Performed by: NEUROLOGICAL SURGERY

## 2023-05-23 PROCEDURE — 99204 OFFICE O/P NEW MOD 45 MIN: CPT | Performed by: NEUROLOGICAL SURGERY

## 2023-05-23 PROCEDURE — G8417 CALC BMI ABV UP PARAM F/U: HCPCS | Performed by: NEUROLOGICAL SURGERY

## 2023-05-23 PROCEDURE — 99202 OFFICE O/P NEW SF 15 MIN: CPT

## 2023-05-23 PROCEDURE — 3077F SYST BP >= 140 MM HG: CPT | Performed by: NEUROLOGICAL SURGERY

## 2023-05-23 PROCEDURE — 1036F TOBACCO NON-USER: CPT | Performed by: NEUROLOGICAL SURGERY

## 2023-05-23 PROCEDURE — 3078F DIAST BP <80 MM HG: CPT | Performed by: NEUROLOGICAL SURGERY

## 2023-05-23 PROCEDURE — G8400 PT W/DXA NO RESULTS DOC: HCPCS | Performed by: NEUROLOGICAL SURGERY

## 2023-05-23 PROCEDURE — 3017F COLORECTAL CA SCREEN DOC REV: CPT | Performed by: NEUROLOGICAL SURGERY

## 2023-05-23 NOTE — PROGRESS NOTES
40 [facility] NEUROSURGERY  [facility] [address] [phone]       Chief Complaint:   Chief Complaint   Patient presents with    New Patient     Referred here for spot on spine  not having any pain         HPI:     I had the pleasure of seeing Rose Cesar today in neurosurgical clinic. As you know this delightful 69-year-old, right-handed, , mother of 2, former smoker nondrinker and retired  was originally being worked up for myositis. Since that time she been placed on prednisone for bilateral upper and lower extremity weakness. PET scan was performed that demonstrated a T5 hypermetabolic vertebral body was subsequently worked up with MRI of the thoracic spine. Upon specific questioning she denied any specific back pain. There is no numbness or tingling. She has had no loss of bowel or bladder function.     Past Medical History:   Diagnosis Date    Diabetes ([address]     Hyperlipemia     Hypertension     MMT (medial meniscus tear)     RIGHT  KNEE  AND SCHEDULED FOR THE SURGERY ON 01/30/2018    Polymyalgia ([address] 2023    MUSCLE WEAKNESS    Primary osteoarthritis of right knee     Thyroid disease      Past Surgical History:   Procedure Laterality Date    APPENDECTOMY      CHOLECYSTECTOMY      COLONOSCOPY      HYSTERECTOMY (CERVIX STATUS UNKNOWN)      KNEE ARTHROSCOPY Right 01/30/2018    MUSCLE BIOPSY Left 5/1/2023    MUSCLE BIOPSY LEFT UPPER ARM performed by Lis Keys MD at [place] [address]        Family History   Problem Relation Age of Onset    Cancer Mother         Cervical    Cancer Father         Leukemia    Cancer Sister         Thyroid      Social History     Socioeconomic History    Marital status:      Spouse name: Not on file    Number of children: Not on file    Years of education: Not on file    Highest education level: Not on file   Occupational History    Not on file

## 2023-05-25 ENCOUNTER — OFFICE VISIT (OUTPATIENT)
Dept: SURGERY | Age: 73
End: 2023-05-25

## 2023-05-25 VITALS
HEART RATE: 62 BPM | BODY MASS INDEX: 31.41 KG/M2 | SYSTOLIC BLOOD PRESSURE: 206 MMHG | DIASTOLIC BLOOD PRESSURE: 69 MMHG | WEIGHT: 183 LBS | OXYGEN SATURATION: 97 %

## 2023-05-25 DIAGNOSIS — M60.9 MYOSITIS, UNSPECIFIED MYOSITIS TYPE, UNSPECIFIED SITE: Primary | ICD-10-CM

## 2023-05-25 PROCEDURE — 99024 POSTOP FOLLOW-UP VISIT: CPT | Performed by: SURGERY

## 2023-05-26 NOTE — PROGRESS NOTES
Appearance: She is not ill-appearing. Cardiovascular:      Rate and Rhythm: Normal rate. Pulmonary:      Effort: No respiratory distress. Abdominal:      Palpations: Abdomen is soft. Skin:     Comments: Incision(s) clean dry and intact   Neurological:      Mental Status: She is alert. Mental status is at baseline. Psychiatric:         Mood and Affect: Mood normal.             Hanny Knox MD      I have examined the patient and performed the key aspects of physical exam,and  reviewed the record (including referring provider notes, all pertinent and new radiology images and laboratory findings, and results). NOTE: This report, in part or full, may have been transcribed using voice recognition software. Every effort was made to ensure accuracy; however, inadvertent computerized transcription errors may be present. Please excuse any transcriptional grammatical or spelling errors that may have escaped my editorial review.       CC: France Najera DO, Dr. Jacques Romero, Dr. Salvatore Sebastian

## 2023-06-06 ENCOUNTER — OFFICE VISIT (OUTPATIENT)
Dept: RHEUMATOLOGY | Age: 73
End: 2023-06-06
Payer: MEDICARE

## 2023-06-06 VITALS — HEIGHT: 64 IN | WEIGHT: 177 LBS | BODY MASS INDEX: 30.22 KG/M2

## 2023-06-06 DIAGNOSIS — D84.9 IMMUNOSUPPRESSION (HCC): ICD-10-CM

## 2023-06-06 DIAGNOSIS — Z79.899 HIGH RISK MEDICATION USE: ICD-10-CM

## 2023-06-06 DIAGNOSIS — D89.89 ANTISYNTHETASE SYNDROME (HCC): Primary | ICD-10-CM

## 2023-06-06 DIAGNOSIS — M33.90 DERMATOMYOSITIS (HCC): ICD-10-CM

## 2023-06-06 DIAGNOSIS — D89.89 ANTISYNTHETASE SYNDROME (HCC): ICD-10-CM

## 2023-06-06 DIAGNOSIS — E11.9 TYPE 2 DIABETES MELLITUS WITHOUT COMPLICATION, WITH LONG-TERM CURRENT USE OF INSULIN (HCC): ICD-10-CM

## 2023-06-06 DIAGNOSIS — Z79.4 TYPE 2 DIABETES MELLITUS WITHOUT COMPLICATION, WITH LONG-TERM CURRENT USE OF INSULIN (HCC): ICD-10-CM

## 2023-06-06 LAB
ALBUMIN SERPL-MCNC: 3.6 G/DL (ref 3.5–5.2)
ALP SERPL-CCNC: 82 U/L (ref 35–104)
ALT SERPL-CCNC: 68 U/L (ref 0–32)
ANION GAP SERPL CALCULATED.3IONS-SCNC: 14 MMOL/L (ref 7–16)
AST SERPL-CCNC: 51 U/L (ref 0–31)
BASOPHILS # BLD: 0.01 E9/L (ref 0–0.2)
BASOPHILS NFR BLD: 0.1 % (ref 0–2)
BILIRUB SERPL-MCNC: 0.5 MG/DL (ref 0–1.2)
BUN SERPL-MCNC: 30 MG/DL (ref 6–23)
CALCIUM SERPL-MCNC: 9.7 MG/DL (ref 8.6–10.2)
CHLORIDE SERPL-SCNC: 98 MMOL/L (ref 98–107)
CK SERPL-CCNC: 403 U/L (ref 20–180)
CO2 SERPL-SCNC: 27 MMOL/L (ref 22–29)
CREAT SERPL-MCNC: 0.8 MG/DL (ref 0.5–1)
CRP SERPL HS-MCNC: 1.1 MG/DL (ref 0–0.4)
EOSINOPHIL # BLD: 0.01 E9/L (ref 0.05–0.5)
EOSINOPHIL NFR BLD: 0.1 % (ref 0–6)
ERYTHROCYTE [DISTWIDTH] IN BLOOD BY AUTOMATED COUNT: 17.2 FL (ref 11.5–15)
ERYTHROCYTE [SEDIMENTATION RATE] IN BLOOD BY WESTERGREN METHOD: 10 MM/HR (ref 0–20)
GLUCOSE SERPL-MCNC: 279 MG/DL (ref 74–99)
HCT VFR BLD AUTO: 43.7 % (ref 34–48)
HGB BLD-MCNC: 13.5 G/DL (ref 11.5–15.5)
IMM GRANULOCYTES # BLD: 0.04 E9/L
IMM GRANULOCYTES NFR BLD: 0.4 % (ref 0–5)
LYMPHOCYTES # BLD: 0.66 E9/L (ref 1.5–4)
LYMPHOCYTES NFR BLD: 7.3 % (ref 20–42)
MCH RBC QN AUTO: 26.9 PG (ref 26–35)
MCHC RBC AUTO-ENTMCNC: 30.9 % (ref 32–34.5)
MCV RBC AUTO: 87.2 FL (ref 80–99.9)
MONOCYTES # BLD: 0.13 E9/L (ref 0.1–0.95)
MONOCYTES NFR BLD: 1.4 % (ref 2–12)
NEUTROPHILS # BLD: 8.19 E9/L (ref 1.8–7.3)
NEUTS SEG NFR BLD: 90.7 % (ref 43–80)
PLATELET # BLD AUTO: 274 E9/L (ref 130–450)
PMV BLD AUTO: 12 FL (ref 7–12)
POTASSIUM SERPL-SCNC: 4.5 MMOL/L (ref 3.5–5)
PROT SERPL-MCNC: 6.9 G/DL (ref 6.4–8.3)
RBC # BLD AUTO: 5.01 E12/L (ref 3.5–5.5)
SODIUM SERPL-SCNC: 139 MMOL/L (ref 132–146)
WBC # BLD: 9 E9/L (ref 4.5–11.5)

## 2023-06-06 PROCEDURE — 1123F ACP DISCUSS/DSCN MKR DOCD: CPT | Performed by: INTERNAL MEDICINE

## 2023-06-06 PROCEDURE — G8427 DOCREV CUR MEDS BY ELIG CLIN: HCPCS | Performed by: INTERNAL MEDICINE

## 2023-06-06 PROCEDURE — G8417 CALC BMI ABV UP PARAM F/U: HCPCS | Performed by: INTERNAL MEDICINE

## 2023-06-06 PROCEDURE — 3046F HEMOGLOBIN A1C LEVEL >9.0%: CPT | Performed by: INTERNAL MEDICINE

## 2023-06-06 PROCEDURE — G8400 PT W/DXA NO RESULTS DOC: HCPCS | Performed by: INTERNAL MEDICINE

## 2023-06-06 PROCEDURE — 1090F PRES/ABSN URINE INCON ASSESS: CPT | Performed by: INTERNAL MEDICINE

## 2023-06-06 PROCEDURE — 3017F COLORECTAL CA SCREEN DOC REV: CPT | Performed by: INTERNAL MEDICINE

## 2023-06-06 PROCEDURE — 99215 OFFICE O/P EST HI 40 MIN: CPT | Performed by: INTERNAL MEDICINE

## 2023-06-06 PROCEDURE — 1036F TOBACCO NON-USER: CPT | Performed by: INTERNAL MEDICINE

## 2023-06-06 PROCEDURE — 2022F DILAT RTA XM EVC RTNOPTHY: CPT | Performed by: INTERNAL MEDICINE

## 2023-06-06 RX ORDER — PREDNISONE 5 MG/1
5 TABLET ORAL DAILY
Qty: 90 TABLET | Refills: 3 | Status: SHIPPED | OUTPATIENT
Start: 2023-06-06

## 2023-06-06 RX ORDER — PREDNISONE 20 MG/1
50 TABLET ORAL DAILY
Qty: 90 TABLET | Refills: 1 | Status: SHIPPED | OUTPATIENT
Start: 2023-06-06

## 2023-06-06 ASSESSMENT — ENCOUNTER SYMPTOMS
DIARRHEA: 0
TROUBLE SWALLOWING: 1
ABDOMINAL PAIN: 0
VOMITING: 0
NAUSEA: 0
SHORTNESS OF BREATH: 0
COLOR CHANGE: 1
COUGH: 0

## 2023-06-06 NOTE — PATIENT INSTRUCTIONS
Decrease prednisone to 50mg daily starting tomorrow for one week, then decrease to 40mg daily for one week, then 35mg (1.5 twenty mg tabs plus on 5mg tab) daily for one week, then 30mg daily for one wee, then 25mg daily for one week, then 20mg daily    May or may not start IVIG in the meantime     Will Likely give you a dose of Reclast     Have bone density scan

## 2023-06-06 NOTE — PROGRESS NOTES
Deformity present. No swelling or tenderness. Comments: She has Heberden's nodes. Skin:     General: Skin is warm and dry. Findings: Rash present. Comments: She does have dry skin on the hands which could be consistent with mechanics hands and lateral Gottron's papules. Neurological:      General: No focal deficit present. Mental Status: She is alert and oriented to person, place, and time. Mental status is at baseline. Comments: Strength is 4 out of 5 in the hip flexors. 5 out of 5 in the biceps, 4 out of 5 in the right deltoid and 4+ out of 5 in the left deltoid, 5 out of 5  strength. Psychiatric:         Mood and Affect: Mood normal.         Behavior: Behavior normal.          Lab Results   Component Value Date    WBC 4.4 (L) 04/20/2023    HGB 11.2 (L) 04/20/2023    HCT 36.5 04/20/2023    MCV 88.8 04/20/2023     04/20/2023     Lab Results   Component Value Date     04/20/2023    K 4.2 04/20/2023     04/20/2023    CO2 25 04/20/2023    BUN 13 04/20/2023    CREATININE 0.7 04/20/2023    GLUCOSE 60 (L) 04/20/2023    CALCIUM 9.2 04/20/2023    PROT 6.7 04/20/2023    LABALBU 3.6 04/20/2023    BILITOT 0.5 04/20/2023    ALKPHOS 53 04/20/2023     (H) 04/20/2023    ALT 53 (H) 04/20/2023    LABGLOM >60 04/20/2023     Lab Results   Component Value Date    NOAH NEGATIVE 01/31/2023     No results found for: RHEUMFACTOR  Lab Results   Component Value Date    SEDRATE 17 05/11/2023     Lab Results   Component Value Date    CRP 0.8 (H) 05/11/2023            An electronic signature was used to authenticate this note. This note was generated with a voice recognition dictation system. Please disregard any errors or omission which have escaped my review.     --Raghav Mathias DO

## 2023-06-08 LAB — ALDOLASE SERPL-CCNC: 11.8 U/L (ref 1.2–7.6)

## 2023-06-19 DIAGNOSIS — T38.0X5A CORTICOSTEROID-INDUCED OSTEOPOROSIS: Primary | ICD-10-CM

## 2023-06-19 DIAGNOSIS — M81.8 CORTICOSTEROID-INDUCED OSTEOPOROSIS: Primary | ICD-10-CM

## 2023-06-19 RX ORDER — FAMOTIDINE 10 MG/ML
20 INJECTION, SOLUTION INTRAVENOUS
OUTPATIENT
Start: 2023-06-19

## 2023-06-19 RX ORDER — DIPHENHYDRAMINE HYDROCHLORIDE 50 MG/ML
50 INJECTION INTRAMUSCULAR; INTRAVENOUS
OUTPATIENT
Start: 2023-06-19

## 2023-06-19 RX ORDER — SODIUM CHLORIDE 0.9 % (FLUSH) 0.9 %
5-40 SYRINGE (ML) INJECTION PRN
OUTPATIENT
Start: 2023-06-19

## 2023-06-19 RX ORDER — SODIUM CHLORIDE 9 MG/ML
5-250 INJECTION, SOLUTION INTRAVENOUS PRN
OUTPATIENT
Start: 2023-06-19

## 2023-06-19 RX ORDER — EPINEPHRINE 1 MG/ML
0.3 INJECTION, SOLUTION, CONCENTRATE INTRAVENOUS PRN
OUTPATIENT
Start: 2023-06-19

## 2023-06-19 RX ORDER — ONDANSETRON 2 MG/ML
8 INJECTION INTRAMUSCULAR; INTRAVENOUS
OUTPATIENT
Start: 2023-06-19

## 2023-06-19 RX ORDER — ZOLEDRONIC ACID 5 MG/100ML
5 INJECTION, SOLUTION INTRAVENOUS ONCE
OUTPATIENT
Start: 2023-06-19 | End: 2023-06-19

## 2023-06-19 RX ORDER — SODIUM CHLORIDE 9 MG/ML
INJECTION, SOLUTION INTRAVENOUS CONTINUOUS
OUTPATIENT
Start: 2023-06-19

## 2023-06-19 RX ORDER — ALBUTEROL SULFATE 90 UG/1
4 AEROSOL, METERED RESPIRATORY (INHALATION) PRN
OUTPATIENT
Start: 2023-06-19

## 2023-06-19 RX ORDER — HEPARIN SODIUM (PORCINE) LOCK FLUSH IV SOLN 100 UNIT/ML 100 UNIT/ML
500 SOLUTION INTRAVENOUS PRN
OUTPATIENT
Start: 2023-06-19

## 2023-06-19 RX ORDER — ACETAMINOPHEN 325 MG/1
650 TABLET ORAL
OUTPATIENT
Start: 2023-06-19

## 2023-06-22 ENCOUNTER — OFFICE VISIT (OUTPATIENT)
Dept: ONCOLOGY | Age: 73
End: 2023-06-22
Payer: MEDICARE

## 2023-06-22 ENCOUNTER — HOSPITAL ENCOUNTER (OUTPATIENT)
Dept: INFUSION THERAPY | Age: 73
Discharge: HOME | End: 2023-06-22

## 2023-06-22 VITALS
DIASTOLIC BLOOD PRESSURE: 48 MMHG | OXYGEN SATURATION: 100 % | WEIGHT: 173.4 LBS | TEMPERATURE: 96.8 F | HEART RATE: 62 BPM | SYSTOLIC BLOOD PRESSURE: 149 MMHG | BODY MASS INDEX: 29.6 KG/M2 | HEIGHT: 64 IN

## 2023-06-22 DIAGNOSIS — R93.5 ABNORMAL FINDINGS ON DIAGNOSTIC IMAGING OF OTHER ABDOMINAL REGIONS, INCLUDING RETROPERITONEUM: ICD-10-CM

## 2023-06-22 DIAGNOSIS — M89.9 BONE LESION: ICD-10-CM

## 2023-06-22 DIAGNOSIS — R59.1 LYMPHADENOPATHY: Primary | ICD-10-CM

## 2023-06-22 PROCEDURE — 99213 OFFICE O/P EST LOW 20 MIN: CPT

## 2023-06-22 PROCEDURE — 99214 OFFICE O/P EST MOD 30 MIN: CPT | Performed by: STUDENT IN AN ORGANIZED HEALTH CARE EDUCATION/TRAINING PROGRAM

## 2023-06-22 PROCEDURE — 1090F PRES/ABSN URINE INCON ASSESS: CPT | Performed by: STUDENT IN AN ORGANIZED HEALTH CARE EDUCATION/TRAINING PROGRAM

## 2023-06-22 PROCEDURE — 3017F COLORECTAL CA SCREEN DOC REV: CPT | Performed by: STUDENT IN AN ORGANIZED HEALTH CARE EDUCATION/TRAINING PROGRAM

## 2023-06-22 PROCEDURE — G8417 CALC BMI ABV UP PARAM F/U: HCPCS | Performed by: STUDENT IN AN ORGANIZED HEALTH CARE EDUCATION/TRAINING PROGRAM

## 2023-06-22 PROCEDURE — 3077F SYST BP >= 140 MM HG: CPT | Performed by: STUDENT IN AN ORGANIZED HEALTH CARE EDUCATION/TRAINING PROGRAM

## 2023-06-22 PROCEDURE — 1036F TOBACCO NON-USER: CPT | Performed by: STUDENT IN AN ORGANIZED HEALTH CARE EDUCATION/TRAINING PROGRAM

## 2023-06-22 PROCEDURE — 1123F ACP DISCUSS/DSCN MKR DOCD: CPT | Performed by: STUDENT IN AN ORGANIZED HEALTH CARE EDUCATION/TRAINING PROGRAM

## 2023-06-22 PROCEDURE — G8399 PT W/DXA RESULTS DOCUMENT: HCPCS | Performed by: STUDENT IN AN ORGANIZED HEALTH CARE EDUCATION/TRAINING PROGRAM

## 2023-06-22 PROCEDURE — 3078F DIAST BP <80 MM HG: CPT | Performed by: STUDENT IN AN ORGANIZED HEALTH CARE EDUCATION/TRAINING PROGRAM

## 2023-06-22 PROCEDURE — G8427 DOCREV CUR MEDS BY ELIG CLIN: HCPCS | Performed by: STUDENT IN AN ORGANIZED HEALTH CARE EDUCATION/TRAINING PROGRAM

## 2023-06-22 ASSESSMENT — ENCOUNTER SYMPTOMS
COUGH: 0
BACK PAIN: 0
SHORTNESS OF BREATH: 0
GASTROINTESTINAL NEGATIVE: 1

## 2023-06-22 NOTE — PROGRESS NOTES
[address] [facility] MEDICAL ONCOLOGY  [address] [place] [address]  [place] [address]  Dept: [phone]  Loc: [phone]  Clinic Progress Note    Referring Provider:  Kee Mcclendon DO    Reason for Visit:   Lymphadenopathy    PCP:  Radha Nassar DO    Demographics: 67 y.o. female    Chief Complaint:   Chief Complaint   Patient presents with    Follow-up     Lymphadenopathy       Subjective:  Patient states she largely feel well but unchanged. She is being tapered down on her steroids. Denies of new symptoms. Appears to continue to lose some more weight. No new night sweats, fevers, chills or adenopathy. HPI from Initial Outpatient Consultation  (4/20/23): The patient is a 67 y.o. female comes in due to findings of lymphadenopathy on CT scans. The patient follows rheumatology with Dr. Jamil Andrews, and currently being worked up for myositis. The patient reports having some symptoms, including not limited to a 40 pound weight loss since November 2022, and has been struggling with dysphagia/choking, which she was noted to be associate with her rheumatologic diagnosis. Further rheumatologic evaluation included CT scans, in which there was adenopathy involving the chest, abdomen, groin/pelvis. There was also noted indeterminate lucent versus potential lytic lesions involving the lower spine and pelvis. Patient was subsequently referred to our service for further evaluation and care. Today, patient was accompanied by her . She does have fatigue. Otherwise denies of any bleed. She reports her last colonoscopy was back in 2019, and her last mammogram was back in October 2022. It has been several years since her last Pap smear. Otherwise she denies of significant bony pains. She has difficulty raising her arms above her head. Also denies of feeling any lymph nodes. Review of Systems;   Review of Systems   Constitutional:  Negative for chills,

## 2023-06-30 ENCOUNTER — HOSPITAL ENCOUNTER (OUTPATIENT)
Age: 73
Discharge: HOME | End: 2023-06-30
Payer: MEDICARE

## 2023-06-30 DIAGNOSIS — R59.1 LYMPHADENOPATHY: ICD-10-CM

## 2023-06-30 LAB
ANION GAP SERPL CALCULATED.3IONS-SCNC: 9 MMOL/L (ref 7–16)
BASOPHILS # BLD: 0.02 E9/L (ref 0–0.2)
BASOPHILS NFR BLD: 0.2 % (ref 0–2)
BUN SERPL-MCNC: 22 MG/DL (ref 6–23)
CALCIUM SERPL-MCNC: 9.8 MG/DL (ref 8.6–10.2)
CHLORIDE SERPL-SCNC: 100 MMOL/L (ref 98–107)
CO2 SERPL-SCNC: 31 MMOL/L (ref 22–29)
CREAT SERPL-MCNC: 0.8 MG/DL (ref 0.5–1)
EOSINOPHIL # BLD: 0.01 E9/L (ref 0.05–0.5)
EOSINOPHIL NFR BLD: 0.1 % (ref 0–6)
ERYTHROCYTE [DISTWIDTH] IN BLOOD BY AUTOMATED COUNT: 16.8 FL (ref 11.5–15)
GLUCOSE SERPL-MCNC: 160 MG/DL (ref 74–99)
HCT VFR BLD AUTO: 41.6 % (ref 34–48)
HGB BLD-MCNC: 13.2 G/DL (ref 11.5–15.5)
IMM GRANULOCYTES # BLD: 0.08 E9/L
IMM GRANULOCYTES NFR BLD: 0.8 % (ref 0–5)
LYMPHOCYTES # BLD: 0.77 E9/L (ref 1.5–4)
LYMPHOCYTES NFR BLD: 7.7 % (ref 20–42)
MCH RBC QN AUTO: 27.7 PG (ref 26–35)
MCHC RBC AUTO-ENTMCNC: 31.7 % (ref 32–34.5)
MCV RBC AUTO: 87.2 FL (ref 80–99.9)
MONOCYTES # BLD: 0.36 E9/L (ref 0.1–0.95)
MONOCYTES NFR BLD: 3.6 % (ref 2–12)
NEUTROPHILS # BLD: 8.71 E9/L (ref 1.8–7.3)
NEUTS SEG NFR BLD: 87.6 % (ref 43–80)
PLATELET # BLD AUTO: 252 E9/L (ref 130–450)
PMV BLD AUTO: 10.5 FL (ref 7–12)
POTASSIUM SERPL-SCNC: 4.7 MMOL/L (ref 3.5–5)
RBC # BLD AUTO: 4.77 E12/L (ref 3.5–5.5)
SODIUM SERPL-SCNC: 140 MMOL/L (ref 132–146)
WBC # BLD: 10 E9/L (ref 4.5–11.5)

## 2023-06-30 PROCEDURE — 36415 COLL VENOUS BLD VENIPUNCTURE: CPT

## 2023-06-30 PROCEDURE — 80048 BASIC METABOLIC PNL TOTAL CA: CPT

## 2023-06-30 PROCEDURE — 85025 COMPLETE CBC W/AUTO DIFF WBC: CPT

## 2023-07-03 NOTE — PROGRESS NOTES
Received a referral for Reclast along with labs. Called to hospital Pharmacist Zander to have creatinine clearance calculated. Patient is 75years old, height 5'4\", weight 170 lb , gender female, creatinine level 0.8. Pharmacist did calculation and I was told creatinine clearance is 75 ml/min.

## 2023-07-06 ENCOUNTER — HOSPITAL ENCOUNTER (OUTPATIENT)
Dept: INFUSION THERAPY | Age: 73
Setting detail: INFUSION SERIES
Discharge: HOME | End: 2023-07-06
Payer: MEDICARE

## 2023-07-06 VITALS
HEART RATE: 58 BPM | BODY MASS INDEX: 29.02 KG/M2 | HEIGHT: 64 IN | DIASTOLIC BLOOD PRESSURE: 63 MMHG | WEIGHT: 170 LBS | TEMPERATURE: 97.9 F | SYSTOLIC BLOOD PRESSURE: 166 MMHG | OXYGEN SATURATION: 98 % | RESPIRATION RATE: 16 BRPM

## 2023-07-06 DIAGNOSIS — M81.8 CORTICOSTEROID-INDUCED OSTEOPOROSIS: Primary | ICD-10-CM

## 2023-07-06 DIAGNOSIS — T38.0X5A CORTICOSTEROID-INDUCED OSTEOPOROSIS: Primary | ICD-10-CM

## 2023-07-06 PROCEDURE — 96365 THER/PROPH/DIAG IV INF INIT: CPT

## 2023-07-06 PROCEDURE — 2580000003 HC RX 258: Performed by: INTERNAL MEDICINE

## 2023-07-06 PROCEDURE — 6360000002 HC RX W HCPCS: Performed by: INTERNAL MEDICINE

## 2023-07-06 RX ORDER — SODIUM CHLORIDE 9 MG/ML
INJECTION, SOLUTION INTRAVENOUS CONTINUOUS
OUTPATIENT
Start: 2024-07-04

## 2023-07-06 RX ORDER — HEPARIN SODIUM 100 [USP'U]/ML
500 INJECTION, SOLUTION INTRAVENOUS PRN
OUTPATIENT
Start: 2024-07-04

## 2023-07-06 RX ORDER — ZOLEDRONIC ACID 5 MG/100ML
5 INJECTION, SOLUTION INTRAVENOUS ONCE
Status: COMPLETED | OUTPATIENT
Start: 2023-07-06 | End: 2023-07-06

## 2023-07-06 RX ORDER — EPINEPHRINE 1 MG/ML
0.3 INJECTION, SOLUTION, CONCENTRATE INTRAVENOUS PRN
OUTPATIENT
Start: 2024-07-04

## 2023-07-06 RX ORDER — DIPHENHYDRAMINE HYDROCHLORIDE 50 MG/ML
50 INJECTION INTRAMUSCULAR; INTRAVENOUS
OUTPATIENT
Start: 2024-07-04

## 2023-07-06 RX ORDER — SODIUM CHLORIDE 9 MG/ML
5-250 INJECTION, SOLUTION INTRAVENOUS PRN
OUTPATIENT
Start: 2024-07-04

## 2023-07-06 RX ORDER — ALBUTEROL SULFATE 90 UG/1
4 AEROSOL, METERED RESPIRATORY (INHALATION) PRN
OUTPATIENT
Start: 2024-07-04

## 2023-07-06 RX ORDER — SODIUM CHLORIDE 0.9 % (FLUSH) 0.9 %
5-40 SYRINGE (ML) INJECTION PRN
OUTPATIENT
Start: 2024-07-04

## 2023-07-06 RX ORDER — ZOLEDRONIC ACID 5 MG/100ML
5 INJECTION, SOLUTION INTRAVENOUS ONCE
OUTPATIENT
Start: 2024-07-04 | End: 2024-07-04

## 2023-07-06 RX ORDER — SODIUM CHLORIDE 0.9 % (FLUSH) 0.9 %
5-40 SYRINGE (ML) INJECTION PRN
Status: DISCONTINUED | OUTPATIENT
Start: 2023-07-06 | End: 2023-07-07 | Stop reason: HOSPADM

## 2023-07-06 RX ORDER — ACETAMINOPHEN 325 MG/1
650 TABLET ORAL
OUTPATIENT
Start: 2024-07-04

## 2023-07-06 RX ORDER — ONDANSETRON 2 MG/ML
8 INJECTION INTRAMUSCULAR; INTRAVENOUS
OUTPATIENT
Start: 2024-07-04

## 2023-07-06 RX ORDER — SODIUM CHLORIDE 9 MG/ML
5-250 INJECTION, SOLUTION INTRAVENOUS PRN
Status: DISCONTINUED | OUTPATIENT
Start: 2023-07-06 | End: 2023-07-07 | Stop reason: HOSPADM

## 2023-07-06 RX ADMIN — ZOLEDRONIC ACID 5 MG: 5 INJECTION, SOLUTION INTRAVENOUS at 10:01

## 2023-07-06 RX ADMIN — SODIUM CHLORIDE, PRESERVATIVE FREE 10 ML: 5 INJECTION INTRAVENOUS at 10:21

## 2023-07-06 RX ADMIN — SODIUM CHLORIDE 20 ML/HR: 9 INJECTION, SOLUTION INTRAVENOUS at 09:56

## 2023-07-06 RX ADMIN — SODIUM CHLORIDE, PRESERVATIVE FREE 10 ML: 5 INJECTION INTRAVENOUS at 09:54

## 2023-07-06 NOTE — PROGRESS NOTES
Tolerated injection well. Reviewed therapy plan, offered education material and/or discharge material, reviewed medication information and signs and symptoms  and educated on possible side effects, verbalizes good knowledge of current plan patient verbalizes understanding, and has no signs or symptoms to report at this time. Patient discharged. Patient alert and oriented x3. No distress noted. Vital signs stable. Patient denies any new or worsening pain. Patient denies any needs. All questions answered.

## 2023-07-24 NOTE — PROGRESS NOTES
in his care.      Sincerely,        [address] [place] [address]  Office: [phone]  Fax: [phone]

## 2023-08-02 ENCOUNTER — OFFICE VISIT (OUTPATIENT)
Dept: RHEUMATOLOGY | Age: 73
End: 2023-08-02
Payer: MEDICARE

## 2023-08-02 VITALS — BODY MASS INDEX: 29.88 KG/M2 | WEIGHT: 175 LBS | HEIGHT: 64 IN

## 2023-08-02 DIAGNOSIS — R93.5 ABNORMAL FINDINGS ON DIAGNOSTIC IMAGING OF OTHER ABDOMINAL REGIONS, INCLUDING RETROPERITONEUM: ICD-10-CM

## 2023-08-02 DIAGNOSIS — D89.89 ANTISYNTHETASE SYNDROME (HCC): ICD-10-CM

## 2023-08-02 DIAGNOSIS — D84.9 IMMUNOSUPPRESSION (HCC): ICD-10-CM

## 2023-08-02 DIAGNOSIS — M81.8 CORTICOSTEROID-INDUCED OSTEOPOROSIS: ICD-10-CM

## 2023-08-02 DIAGNOSIS — Z79.899 HIGH RISK MEDICATION USE: ICD-10-CM

## 2023-08-02 DIAGNOSIS — M89.9 BONE LESION: ICD-10-CM

## 2023-08-02 DIAGNOSIS — D89.89 ANTISYNTHETASE SYNDROME (HCC): Primary | ICD-10-CM

## 2023-08-02 DIAGNOSIS — R59.1 LYMPHADENOPATHY: ICD-10-CM

## 2023-08-02 DIAGNOSIS — E11.9 TYPE 2 DIABETES MELLITUS WITHOUT COMPLICATION, WITH LONG-TERM CURRENT USE OF INSULIN (HCC): ICD-10-CM

## 2023-08-02 DIAGNOSIS — T38.0X5A CORTICOSTEROID-INDUCED OSTEOPOROSIS: ICD-10-CM

## 2023-08-02 DIAGNOSIS — Z79.4 TYPE 2 DIABETES MELLITUS WITHOUT COMPLICATION, WITH LONG-TERM CURRENT USE OF INSULIN (HCC): ICD-10-CM

## 2023-08-02 LAB
ABSOLUTE IMMATURE GRANULOCYTE: 0.04 K/UL (ref 0–0.58)
ALBUMIN SERPL-MCNC: 3.8 G/DL (ref 3.5–5.2)
ALP BLD-CCNC: 79 U/L (ref 35–104)
ALT SERPL-CCNC: 13 U/L (ref 0–32)
ANION GAP SERPL CALCULATED.3IONS-SCNC: 16 MMOL/L (ref 7–16)
AST SERPL-CCNC: 22 U/L (ref 0–31)
BASOPHILS ABSOLUTE: 0.01 K/UL (ref 0–0.2)
BASOPHILS RELATIVE PERCENT: 0 % (ref 0–2)
BILIRUB SERPL-MCNC: 0.6 MG/DL (ref 0–1.2)
BUN BLDV-MCNC: 17 MG/DL (ref 6–23)
C-REACTIVE PROTEIN: 42 MG/L (ref 0–5)
CALCIUM SERPL-MCNC: 9.4 MG/DL (ref 8.6–10.2)
CHLORIDE BLD-SCNC: 104 MMOL/L (ref 98–107)
CO2: 24 MMOL/L (ref 22–29)
CREAT SERPL-MCNC: 0.8 MG/DL (ref 0.5–1)
EOSINOPHILS ABSOLUTE: 0.02 K/UL (ref 0.05–0.5)
EOSINOPHILS RELATIVE PERCENT: 0 % (ref 0–6)
GFR SERPL CREATININE-BSD FRML MDRD: >60 ML/MIN/1.73M2
GLUCOSE BLD-MCNC: 176 MG/DL (ref 74–99)
HCT VFR BLD CALC: 39.3 % (ref 34–48)
HEMOGLOBIN: 11.9 G/DL (ref 11.5–15.5)
IMMATURE GRANULOCYTES: 1 % (ref 0–5)
LACTATE DEHYDROGENASE: 907 U/L (ref 135–214)
LYMPHOCYTES ABSOLUTE: 0.51 K/UL (ref 1.5–4)
LYMPHOCYTES RELATIVE PERCENT: 7 % (ref 20–42)
MCH RBC QN AUTO: 28.1 PG (ref 26–35)
MCHC RBC AUTO-ENTMCNC: 30.3 G/DL (ref 32–34.5)
MCV RBC AUTO: 92.9 FL (ref 80–99.9)
MONOCYTES ABSOLUTE: 0.34 K/UL (ref 0.1–0.95)
MONOCYTES RELATIVE PERCENT: 5 % (ref 2–12)
NEUTROPHILS ABSOLUTE: 6.66 K/UL (ref 1.8–7.3)
NEUTROPHILS RELATIVE PERCENT: 88 % (ref 43–80)
PDW BLD-RTO: 15.8 % (ref 11.5–15)
PLATELET # BLD: 290 K/UL (ref 130–450)
PMV BLD AUTO: 10.8 FL (ref 7–12)
POTASSIUM SERPL-SCNC: 4.4 MMOL/L (ref 3.5–5)
RBC # BLD: 4.23 M/UL (ref 3.5–5.5)
RBC # BLD: ABNORMAL 10*6/UL
SODIUM BLD-SCNC: 144 MMOL/L (ref 132–146)
TOTAL CK: 63 U/L (ref 20–180)
TOTAL PROTEIN: 7.2 G/DL (ref 6.4–8.3)
WBC # BLD: 7.6 K/UL (ref 4.5–11.5)

## 2023-08-02 PROCEDURE — 1090F PRES/ABSN URINE INCON ASSESS: CPT | Performed by: INTERNAL MEDICINE

## 2023-08-02 PROCEDURE — 1123F ACP DISCUSS/DSCN MKR DOCD: CPT | Performed by: INTERNAL MEDICINE

## 2023-08-02 PROCEDURE — G8417 CALC BMI ABV UP PARAM F/U: HCPCS | Performed by: INTERNAL MEDICINE

## 2023-08-02 PROCEDURE — 3017F COLORECTAL CA SCREEN DOC REV: CPT | Performed by: INTERNAL MEDICINE

## 2023-08-02 PROCEDURE — 1036F TOBACCO NON-USER: CPT | Performed by: INTERNAL MEDICINE

## 2023-08-02 PROCEDURE — 99215 OFFICE O/P EST HI 40 MIN: CPT | Performed by: INTERNAL MEDICINE

## 2023-08-02 PROCEDURE — 2022F DILAT RTA XM EVC RTNOPTHY: CPT | Performed by: INTERNAL MEDICINE

## 2023-08-02 PROCEDURE — G8427 DOCREV CUR MEDS BY ELIG CLIN: HCPCS | Performed by: INTERNAL MEDICINE

## 2023-08-02 PROCEDURE — G8399 PT W/DXA RESULTS DOCUMENT: HCPCS | Performed by: INTERNAL MEDICINE

## 2023-08-02 PROCEDURE — 3046F HEMOGLOBIN A1C LEVEL >9.0%: CPT | Performed by: INTERNAL MEDICINE

## 2023-08-02 RX ORDER — PREDNISONE 5 MG/1
17.5 TABLET ORAL DAILY
Qty: 120 TABLET | Refills: 2 | Status: SHIPPED | OUTPATIENT
Start: 2023-08-02

## 2023-08-02 ASSESSMENT — ENCOUNTER SYMPTOMS
TROUBLE SWALLOWING: 0
COLOR CHANGE: 1
DIARRHEA: 0
ABDOMINAL PAIN: 0
SHORTNESS OF BREATH: 0
VOMITING: 0
COUGH: 0
NAUSEA: 0

## 2023-08-02 NOTE — PATIENT INSTRUCTIONS
Decrease prednisone to 17.5mg daily for one week, then 15mg daily for one week, then 12.5mg daily for one week, then 10mg daily

## 2023-08-03 LAB — SEDIMENTATION RATE, ERYTHROCYTE: 74 MM/HR (ref 0–20)

## 2023-08-04 LAB — ALDOLASE: 5.5 U/L (ref 1.2–7.6)

## 2023-08-21 ENCOUNTER — HOSPITAL ENCOUNTER (OUTPATIENT)
Dept: PET IMAGING | Age: 73
Discharge: HOME | End: 2023-08-23
Attending: STUDENT IN AN ORGANIZED HEALTH CARE EDUCATION/TRAINING PROGRAM
Payer: MEDICARE

## 2023-08-21 ENCOUNTER — HOSPITAL ENCOUNTER (OUTPATIENT)
Dept: MRI IMAGING | Age: 73
Discharge: HOME | End: 2023-08-23
Attending: NEUROLOGICAL SURGERY
Payer: MEDICARE

## 2023-08-21 DIAGNOSIS — M89.9 BONE LESION: ICD-10-CM

## 2023-08-21 DIAGNOSIS — M48.9 MASS OF THORACIC VERTEBRA: ICD-10-CM

## 2023-08-21 DIAGNOSIS — R93.5 ABNORMAL FINDINGS ON DIAGNOSTIC IMAGING OF OTHER ABDOMINAL REGIONS, INCLUDING RETROPERITONEUM: ICD-10-CM

## 2023-08-21 DIAGNOSIS — R59.1 LYMPHADENOPATHY: ICD-10-CM

## 2023-08-21 LAB — GLUCOSE BLD-MCNC: 105 MG/DL (ref 74–99)

## 2023-08-21 PROCEDURE — 78815 PET IMAGE W/CT SKULL-THIGH: CPT

## 2023-08-21 PROCEDURE — 72157 MRI CHEST SPINE W/O & W/DYE: CPT

## 2023-08-21 PROCEDURE — 6360000004 HC RX CONTRAST MEDICATION: Performed by: RADIOLOGY

## 2023-08-21 PROCEDURE — A9552 F18 FDG: HCPCS | Performed by: RADIOLOGY

## 2023-08-21 PROCEDURE — 3430000000 HC RX DIAGNOSTIC RADIOPHARMACEUTICAL: Performed by: RADIOLOGY

## 2023-08-21 PROCEDURE — 82962 GLUCOSE BLOOD TEST: CPT

## 2023-08-21 PROCEDURE — A9577 INJ MULTIHANCE: HCPCS | Performed by: RADIOLOGY

## 2023-08-21 RX ORDER — FLUDEOXYGLUCOSE F 18 200 MCI/ML
15 INJECTION, SOLUTION INTRAVENOUS
Status: COMPLETED | OUTPATIENT
Start: 2023-08-21 | End: 2023-08-21

## 2023-08-21 RX ADMIN — FLUDEOXYGLUCOSE F 18 15 MILLICURIE: 200 INJECTION, SOLUTION INTRAVENOUS at 11:05

## 2023-08-21 RX ADMIN — GADOBENATE DIMEGLUMINE 16 ML: 529 INJECTION, SOLUTION INTRAVENOUS at 14:04

## 2023-08-22 ENCOUNTER — TELEPHONE (OUTPATIENT)
Dept: ONCOLOGY | Age: 73
End: 2023-08-22

## 2023-08-22 DIAGNOSIS — M89.9 BONE LESION: ICD-10-CM

## 2023-08-22 DIAGNOSIS — K76.9 LIVER LESION: ICD-10-CM

## 2023-08-22 DIAGNOSIS — R59.1 LYMPHADENOPATHY: Primary | ICD-10-CM

## 2023-08-22 NOTE — TELEPHONE ENCOUNTER
PET was done, noting progressive disease. MRI T spine also done but still pending. I called patient. She is feeling largely well. Prednisone is being tapered per rheumatology. Due to PET scan findings, I discussed recommendations for biopsy. Will place in new orders.

## 2023-08-23 DIAGNOSIS — D89.89 ANTISYNTHETASE SYNDROME (HCC): ICD-10-CM

## 2023-08-23 DIAGNOSIS — R53.83 OTHER FATIGUE: ICD-10-CM

## 2023-08-23 DIAGNOSIS — R93.5 ABNORMAL FINDINGS ON DIAGNOSTIC IMAGING OF OTHER ABDOMINAL REGIONS, INCLUDING RETROPERITONEUM: ICD-10-CM

## 2023-08-23 DIAGNOSIS — D64.9 NORMOCYTIC ANEMIA: ICD-10-CM

## 2023-08-23 DIAGNOSIS — R91.8 PULMONARY NODULES: ICD-10-CM

## 2023-08-23 DIAGNOSIS — K76.9 LIVER LESION: ICD-10-CM

## 2023-08-23 DIAGNOSIS — M35.3 POLYMYALGIA (HCC): ICD-10-CM

## 2023-08-23 DIAGNOSIS — D84.9 IMMUNOSUPPRESSION (HCC): ICD-10-CM

## 2023-08-23 DIAGNOSIS — R42 LIGHTHEADEDNESS: ICD-10-CM

## 2023-08-23 DIAGNOSIS — R59.1 LYMPHADENOPATHY: Primary | ICD-10-CM

## 2023-08-25 ENCOUNTER — APPOINTMENT (OUTPATIENT)
Dept: MRI IMAGING | Age: 73
End: 2023-08-25
Attending: NEUROLOGICAL SURGERY
Payer: MEDICARE

## 2023-08-31 ENCOUNTER — OFFICE VISIT (OUTPATIENT)
Dept: RHEUMATOLOGY | Age: 73
End: 2023-08-31

## 2023-08-31 VITALS — BODY MASS INDEX: 29.53 KG/M2 | HEIGHT: 64 IN | WEIGHT: 173 LBS

## 2023-08-31 DIAGNOSIS — Z79.899 HIGH RISK MEDICATION USE: ICD-10-CM

## 2023-08-31 DIAGNOSIS — M81.8 CORTICOSTEROID-INDUCED OSTEOPOROSIS: ICD-10-CM

## 2023-08-31 DIAGNOSIS — D89.89 ANTISYNTHETASE SYNDROME (HCC): ICD-10-CM

## 2023-08-31 DIAGNOSIS — E11.9 TYPE 2 DIABETES MELLITUS WITHOUT COMPLICATION, WITH LONG-TERM CURRENT USE OF INSULIN (HCC): ICD-10-CM

## 2023-08-31 DIAGNOSIS — D84.9 IMMUNOSUPPRESSION (HCC): ICD-10-CM

## 2023-08-31 DIAGNOSIS — M33.90 DERMATOMYOSITIS (HCC): ICD-10-CM

## 2023-08-31 DIAGNOSIS — Z79.4 TYPE 2 DIABETES MELLITUS WITHOUT COMPLICATION, WITH LONG-TERM CURRENT USE OF INSULIN (HCC): ICD-10-CM

## 2023-08-31 DIAGNOSIS — T38.0X5A CORTICOSTEROID-INDUCED OSTEOPOROSIS: ICD-10-CM

## 2023-08-31 DIAGNOSIS — D89.89 ANTISYNTHETASE SYNDROME (HCC): Primary | ICD-10-CM

## 2023-08-31 LAB
ABSOLUTE IMMATURE GRANULOCYTE: 0.05 K/UL (ref 0–0.58)
ALBUMIN SERPL-MCNC: 3.7 G/DL (ref 3.5–5.2)
ALP BLD-CCNC: 70 U/L (ref 35–104)
ALT SERPL-CCNC: 9 U/L (ref 0–32)
ANION GAP SERPL CALCULATED.3IONS-SCNC: 18 MMOL/L (ref 7–16)
AST SERPL-CCNC: 20 U/L (ref 0–31)
BASOPHILS ABSOLUTE: 0.02 K/UL (ref 0–0.2)
BASOPHILS RELATIVE PERCENT: 0 % (ref 0–2)
BILIRUB SERPL-MCNC: 0.3 MG/DL (ref 0–1.2)
BUN BLDV-MCNC: 22 MG/DL (ref 6–23)
C-REACTIVE PROTEIN: 86 MG/L (ref 0–5)
CALCIUM SERPL-MCNC: 9.8 MG/DL (ref 8.6–10.2)
CHLORIDE BLD-SCNC: 101 MMOL/L (ref 98–107)
CO2: 20 MMOL/L (ref 22–29)
CREAT SERPL-MCNC: 0.7 MG/DL (ref 0.5–1)
EOSINOPHILS ABSOLUTE: 0.04 K/UL (ref 0.05–0.5)
EOSINOPHILS RELATIVE PERCENT: 1 % (ref 0–6)
GFR SERPL CREATININE-BSD FRML MDRD: >60 ML/MIN/1.73M2
GLUCOSE BLD-MCNC: 164 MG/DL (ref 74–99)
HCT VFR BLD CALC: 37.1 % (ref 34–48)
HEMOGLOBIN: 11.4 G/DL (ref 11.5–15.5)
IMMATURE GRANULOCYTES: 1 % (ref 0–5)
INR BLD: 1.1
LYMPHOCYTES ABSOLUTE: 0.5 K/UL (ref 1.5–4)
LYMPHOCYTES RELATIVE PERCENT: 8 % (ref 20–42)
MCH RBC QN AUTO: 27.7 PG (ref 26–35)
MCHC RBC AUTO-ENTMCNC: 30.7 G/DL (ref 32–34.5)
MCV RBC AUTO: 90 FL (ref 80–99.9)
MONOCYTES ABSOLUTE: 0.5 K/UL (ref 0.1–0.95)
MONOCYTES RELATIVE PERCENT: 8 % (ref 2–12)
NEUTROPHILS ABSOLUTE: 5.55 K/UL (ref 1.8–7.3)
NEUTROPHILS RELATIVE PERCENT: 83 % (ref 43–80)
PDW BLD-RTO: 14.3 % (ref 11.5–15)
PLATELET # BLD: 280 K/UL (ref 130–450)
PMV BLD AUTO: 11 FL (ref 7–12)
POTASSIUM SERPL-SCNC: 4.6 MMOL/L (ref 3.5–5)
PROTHROMBIN TIME: 12.1 SEC (ref 9.3–12.4)
RBC # BLD: 4.12 M/UL (ref 3.5–5.5)
RBC # BLD: ABNORMAL 10*6/UL
SEDIMENTATION RATE, ERYTHROCYTE: 85 MM/HR (ref 0–20)
SODIUM BLD-SCNC: 139 MMOL/L (ref 132–146)
TOTAL CK: 75 U/L (ref 20–180)
TOTAL PROTEIN: 7.3 G/DL (ref 6.4–8.3)
WBC # BLD: 6.7 K/UL (ref 4.5–11.5)

## 2023-08-31 ASSESSMENT — ENCOUNTER SYMPTOMS
DIARRHEA: 0
NAUSEA: 0
COLOR CHANGE: 1
SHORTNESS OF BREATH: 0
ABDOMINAL PAIN: 0
COUGH: 0
TROUBLE SWALLOWING: 0
VOMITING: 0

## 2023-08-31 NOTE — PROGRESS NOTES
Respiratory:  Negative for cough and shortness of breath. Cardiovascular:  Negative for chest pain. Gastrointestinal:  Negative for abdominal pain, diarrhea, nausea and vomiting. Genitourinary:  Negative for dysuria and hematuria. Musculoskeletal:  Negative for arthralgias, joint swelling and myalgias. Skin:  Positive for color change. Negative for rash. Neurological:  Positive for weakness. Negative for numbness. Hematological:  Negative for adenopathy. All other systems reviewed and are negative. Objective   There were no vitals filed for this visit. Physical Exam  Constitutional:       General: She is not in acute distress. Appearance: Normal appearance. HENT:      Head: Normocephalic and atraumatic. Nose: Nose normal.   Eyes:      General: No scleral icterus. Pulmonary:      Effort: Pulmonary effort is normal.   Musculoskeletal:         General: Deformity present. No swelling or tenderness. Comments: She has Heberden's nodes. Skin:     General: Skin is warm and dry. Findings: No rash. Comments: She does have dry skin on the hands consistent with mechanics hands and bilateral Gottron's papules. Neurological:      General: No focal deficit present. Mental Status: She is alert and oriented to person, place, and time. Mental status is at baseline. Comments: Strength is 4 out of 5 in the left hip flexor and 4+ out of 5 elsewhere.    Psychiatric:         Mood and Affect: Mood normal.         Behavior: Behavior normal.          Lab Results   Component Value Date    WBC 7.6 08/02/2023    HGB 11.9 08/02/2023    HCT 39.3 08/02/2023    MCV 92.9 08/02/2023     08/02/2023     Lab Results   Component Value Date     08/02/2023    K 4.4 08/02/2023     08/02/2023    CO2 24 08/02/2023    BUN 17 08/02/2023    CREATININE 0.8 08/02/2023    GLUCOSE 176 (H) 08/02/2023    CALCIUM 9.4 08/02/2023    PROT 7.2 08/02/2023    LABALBU 3.8 08/02/2023

## 2023-09-03 LAB — ALDOLASE: 8.7 U/L (ref 1.2–7.6)

## 2023-09-18 ENCOUNTER — HOSPITAL ENCOUNTER (OUTPATIENT)
Dept: CT IMAGING | Age: 73
Discharge: HOME | End: 2023-09-20
Payer: MEDICARE

## 2023-09-18 VITALS
RESPIRATION RATE: 18 BRPM | BODY MASS INDEX: 29.88 KG/M2 | HEIGHT: 64 IN | HEART RATE: 60 BPM | TEMPERATURE: 97.8 F | OXYGEN SATURATION: 98 % | WEIGHT: 175 LBS | SYSTOLIC BLOOD PRESSURE: 118 MMHG | DIASTOLIC BLOOD PRESSURE: 58 MMHG

## 2023-09-18 DIAGNOSIS — R59.1 LYMPHADENOPATHY: ICD-10-CM

## 2023-09-18 DIAGNOSIS — M89.9 BONE LESION: ICD-10-CM

## 2023-09-18 DIAGNOSIS — K76.9 LIVER LESION: ICD-10-CM

## 2023-09-18 PROCEDURE — 6360000002 HC RX W HCPCS: Performed by: RADIOLOGY

## 2023-09-18 PROCEDURE — 7100000010 HC PHASE II RECOVERY - FIRST 15 MIN

## 2023-09-18 PROCEDURE — 7100000011 HC PHASE II RECOVERY - ADDTL 15 MIN

## 2023-09-18 PROCEDURE — 49180 BIOPSY ABDOMINAL MASS: CPT

## 2023-09-18 PROCEDURE — 2709999900 CT BIOPSY ABDOMEN RETROPERITONEUM

## 2023-09-18 PROCEDURE — 36415 COLL VENOUS BLD VENIPUNCTURE: CPT

## 2023-09-18 PROCEDURE — 77012 CT SCAN FOR NEEDLE BIOPSY: CPT

## 2023-09-18 RX ORDER — HYDRALAZINE HYDROCHLORIDE 20 MG/ML
INJECTION INTRAMUSCULAR; INTRAVENOUS PRN
Status: COMPLETED | OUTPATIENT
Start: 2023-09-18 | End: 2023-09-18

## 2023-09-18 RX ORDER — DIPHENHYDRAMINE HYDROCHLORIDE 50 MG/ML
INJECTION INTRAMUSCULAR; INTRAVENOUS PRN
Status: COMPLETED | OUTPATIENT
Start: 2023-09-18 | End: 2023-09-18

## 2023-09-18 RX ORDER — ONDANSETRON 2 MG/ML
4 INJECTION INTRAMUSCULAR; INTRAVENOUS ONCE
Status: COMPLETED | OUTPATIENT
Start: 2023-09-18 | End: 2023-09-18

## 2023-09-18 RX ORDER — FENTANYL CITRATE 50 UG/ML
INJECTION, SOLUTION INTRAMUSCULAR; INTRAVENOUS PRN
Status: COMPLETED | OUTPATIENT
Start: 2023-09-18 | End: 2023-09-18

## 2023-09-18 RX ORDER — MIDAZOLAM HYDROCHLORIDE 2 MG/2ML
INJECTION, SOLUTION INTRAMUSCULAR; INTRAVENOUS PRN
Status: COMPLETED | OUTPATIENT
Start: 2023-09-18 | End: 2023-09-18

## 2023-09-18 RX ADMIN — HYDRALAZINE HYDROCHLORIDE 5 MG: 20 INJECTION INTRAMUSCULAR; INTRAVENOUS at 12:12

## 2023-09-18 RX ADMIN — HYDRALAZINE HYDROCHLORIDE 10 MG: 20 INJECTION INTRAMUSCULAR; INTRAVENOUS at 12:18

## 2023-09-18 RX ADMIN — MIDAZOLAM HYDROCHLORIDE 1 MG: 1 INJECTION, SOLUTION INTRAMUSCULAR; INTRAVENOUS at 12:15

## 2023-09-18 RX ADMIN — ONDANSETRON HYDROCHLORIDE 4 MG: 2 SOLUTION INTRAMUSCULAR; INTRAVENOUS at 12:56

## 2023-09-18 RX ADMIN — FENTANYL CITRATE 25 MCG: 50 INJECTION, SOLUTION INTRAMUSCULAR; INTRAVENOUS at 12:23

## 2023-09-18 RX ADMIN — DIPHENHYDRAMINE HYDROCHLORIDE 25 MG: 50 INJECTION, SOLUTION INTRAMUSCULAR; INTRAVENOUS at 12:15

## 2023-09-18 RX ADMIN — FENTANYL CITRATE 50 MCG: 50 INJECTION, SOLUTION INTRAMUSCULAR; INTRAVENOUS at 12:15

## 2023-09-18 RX ADMIN — HYDRALAZINE HYDROCHLORIDE 5 MG: 20 INJECTION INTRAMUSCULAR; INTRAVENOUS at 12:26

## 2023-09-18 NOTE — PROCEDURES
1100Patient arrived to Radiology department for retroperitoneal biopsy. Allergies, home medications, H&P and fasting instructions reviewed with patient. Vital signs taken. IV placed, blood obtained, IV flushed and prn adapter attached. 1116 Procedural instructions given, questions answered, understanding expressed and consent signed.  Patient given fluoroscopy education, no questions at this time
1240 Patient returned from retroperitoneal biopsy. Dressing checked, clean, dry, and intact. Patient stable. No s/s of complications noted or reported. Vitals will be checked q 15min, see flow sheets. C/o nausea, Zofran given. 1315 nausea resolved. Patient eating and drinking well with no s/s of complications noted or reported. 1340 Site was checked with every set of vitals. Site clean dry and intact. Discharge papers reviewed with patient, questions answered, discharge paper signed. Patient taken to door. Patient in stable condition, no s/s of complications noted or reported.
Detail Level: Generalized
Detail Level: Zone
Detail Level: Detailed

## 2023-09-18 NOTE — BRIEF OP NOTE
Brief Postoperative Note      Patient: Chyna Mcbride  YOB: 1950  MRN: [id number]    Date of Procedure: 9/18/2023    Liver mass    Post-Op Diagnosis: Same       CT liver biopsy    DIGNA Byrd    Assistant:  * No surgical staff found *    Anesthesia: *Moderate sedation*    Estimated Blood Loss (mL): Minimal    Complications: None    Specimens:   Liver    Implants:  * No implants in log *      Drains: * No LDAs found *    Findings: 18 G cores      Electronically signed by Paola Yepez MD on 9/18/2023 at 12:42 PM

## 2023-09-18 NOTE — PRE SEDATION
Sedation Pre-Procedure Note    Patient Name: Batsheva Tom   YOB: 1950  Room/Bed: Room/bed info not found  Medical Record Number: [id number]  Date: 9/18/2023   Time: 12:41 PM       Indication:  liver mass    Consent: I have discussed with the patient and/or the patient representative the indication, alternatives, and the possible risks and/or complications of the planned procedure and the anesthesia methods. The patient and/or patient representative appear to understand and agree to proceed. Vital Signs:   Vitals:    09/18/23 1235   BP:    Pulse: 56   Resp: 15   Temp:    SpO2: 95%       Past Medical History:   has a past medical history of Diabetes ([address]), Hyperlipemia, Hypertension, MMT (medial meniscus tear), Polymyalgia ([address]), Primary osteoarthritis of right knee, and Thyroid disease. Past Surgical History:   has a past surgical history that includes Tonsillectomy; Hysterectomy; Appendectomy; Cholecystectomy; Colonoscopy; Knee arthroscopy (Right, 01/30/2018); and Muscle biopsy (Left, 5/1/2023). Medications:   Scheduled Meds:   Continuous Infusions:   PRN Meds:   Home Meds:   Prior to Admission medications    Medication Sig Start Date End Date Taking? Authorizing Provider   predniSONE (DELTASONE) 5 MG tablet Take 3.5 tablets by mouth daily 8/2/23   Mozell Bosworth Migliore, DO   omeprazole (PRILOSEC) 40 MG delayed release capsule Take 1 capsule by mouth every morning (before breakfast) 5/11/23   Bernadine Warren DO   sennosides-docusate sodium (SENOKOT-S) 8.6-50 MG tablet Take 2 tablets by mouth 2 times daily  Patient not taking: Reported on 9/18/2023 5/1/23   Jess Priest MD   JARDIANCE 25 MG tablet 1 tablet daily LD 12/12/22   Historical Provider, MD   furosemide (LASIX) 40 MG tablet as needed 1/4/23   Historical Provider, MD   lisinopril-hydroCHLOROthiazide (PRINZIDE;ZESTORETIC) 20-12.5 MG per tablet Take 1 tablet by mouth in the morning.   Patient not taking: Reported on 9/18/2023

## 2023-09-20 LAB
SEND OUT REPORT: NORMAL
TEST NAME: NORMAL

## 2023-09-29 LAB — SURGICAL PATHOLOGY REPORT: NORMAL

## 2023-10-02 ENCOUNTER — HOSPITAL ENCOUNTER (OUTPATIENT)
Dept: INFUSION THERAPY | Age: 73
Discharge: HOME | End: 2023-10-02

## 2023-10-02 ENCOUNTER — OFFICE VISIT (OUTPATIENT)
Dept: ONCOLOGY | Age: 73
End: 2023-10-02
Payer: MEDICARE

## 2023-10-02 VITALS
DIASTOLIC BLOOD PRESSURE: 49 MMHG | BODY MASS INDEX: 29.91 KG/M2 | HEIGHT: 64 IN | WEIGHT: 175.2 LBS | OXYGEN SATURATION: 96 % | SYSTOLIC BLOOD PRESSURE: 154 MMHG | HEART RATE: 57 BPM | TEMPERATURE: 97.1 F

## 2023-10-02 DIAGNOSIS — C84.70 ANAPLASTIC ALK-NEGATIVE LARGE CELL LYMPHOMA, UNSPECIFIED BODY REGION (HCC): Primary | ICD-10-CM

## 2023-10-02 PROCEDURE — G8484 FLU IMMUNIZE NO ADMIN: HCPCS | Performed by: STUDENT IN AN ORGANIZED HEALTH CARE EDUCATION/TRAINING PROGRAM

## 2023-10-02 PROCEDURE — 1123F ACP DISCUSS/DSCN MKR DOCD: CPT | Performed by: STUDENT IN AN ORGANIZED HEALTH CARE EDUCATION/TRAINING PROGRAM

## 2023-10-02 PROCEDURE — 1036F TOBACCO NON-USER: CPT | Performed by: STUDENT IN AN ORGANIZED HEALTH CARE EDUCATION/TRAINING PROGRAM

## 2023-10-02 PROCEDURE — G8417 CALC BMI ABV UP PARAM F/U: HCPCS | Performed by: STUDENT IN AN ORGANIZED HEALTH CARE EDUCATION/TRAINING PROGRAM

## 2023-10-02 PROCEDURE — 99213 OFFICE O/P EST LOW 20 MIN: CPT

## 2023-10-02 PROCEDURE — G8427 DOCREV CUR MEDS BY ELIG CLIN: HCPCS | Performed by: STUDENT IN AN ORGANIZED HEALTH CARE EDUCATION/TRAINING PROGRAM

## 2023-10-02 PROCEDURE — G8399 PT W/DXA RESULTS DOCUMENT: HCPCS | Performed by: STUDENT IN AN ORGANIZED HEALTH CARE EDUCATION/TRAINING PROGRAM

## 2023-10-02 PROCEDURE — 3077F SYST BP >= 140 MM HG: CPT | Performed by: STUDENT IN AN ORGANIZED HEALTH CARE EDUCATION/TRAINING PROGRAM

## 2023-10-02 PROCEDURE — 1090F PRES/ABSN URINE INCON ASSESS: CPT | Performed by: STUDENT IN AN ORGANIZED HEALTH CARE EDUCATION/TRAINING PROGRAM

## 2023-10-02 PROCEDURE — 99215 OFFICE O/P EST HI 40 MIN: CPT | Performed by: STUDENT IN AN ORGANIZED HEALTH CARE EDUCATION/TRAINING PROGRAM

## 2023-10-02 PROCEDURE — 3017F COLORECTAL CA SCREEN DOC REV: CPT | Performed by: STUDENT IN AN ORGANIZED HEALTH CARE EDUCATION/TRAINING PROGRAM

## 2023-10-02 PROCEDURE — 3078F DIAST BP <80 MM HG: CPT | Performed by: STUDENT IN AN ORGANIZED HEALTH CARE EDUCATION/TRAINING PROGRAM

## 2023-10-02 ASSESSMENT — ENCOUNTER SYMPTOMS
COUGH: 0
GASTROINTESTINAL NEGATIVE: 1
SHORTNESS OF BREATH: 0
BACK PAIN: 0

## 2023-10-02 NOTE — PROGRESS NOTES
Patient provided with discharge instructions. All questions answered. Patient understands follow up plan of care.
enlarged with adjacent minimal edema. Indeterminate lucent versus potential lytic lesions throughout the lower lumbar spine and pelvis as described above without pathologic fracture clearly evident could represent osseous metastasis or myeloma thus involvement given adenopathy. No critical spinal canal stenosis is evident however multilevel degenerative changes may be further evaluated with nuclear medicine bone scan and or MRI     MRI HUMERUS LEFT WO CONTRAST    Result Date: 4/18/2023  EXAMINATION: MRI OF THE LEFT HUMERUS WITHOUT CONTRAST, 4/14/2023 11:26 am TECHNIQUE: Multiplanar multisequence MRI of the left humerus was performed without the administration of intravenous contrast. COMPARISON: None HISTORY: ORDERING SYSTEM PROVIDED HISTORY: Antisynthetase syndrome ([address]) TECHNOLOGIST PROVIDED HISTORY: Reason for exam:->Please evaluate for inflammatory myositis FINDINGS: SOFT TISSUES: No significant superficial soft tissue edema, fluid collection, or appreciable masses on this limited noncontrast study. No discrete lymphadenopathy by size criteria. MUSCULATURES/TENDONS: Normal muscle bulk. There is diffuse muscle edema, proximal greater than distal.  Findings are most predominant within the rotator cuff musculature and triceps. The study is not optimized to evaluate for rotator cuff pathology. A fully retracted tendon tear is not definitively visualized. BONE MARROW: No discrete T1 fracture line. There is T2 hyperintense, T1 isointense to hypointense signal within the vertebral body of T6, nonspecific. JOINTS: Small elbow joint effusion, nonspecific. No discrete shoulder joint effusion. Mild degenerative changes of the acromioclavicular joint are suspected. Nonspecific diffuse muscle edema, proximal greater than distal.  Findings may be secondary to an underlying inflammatory etiology. An infectious process is thought to be less likely. A neuropathic process is also a consideration.  Patchy marrow

## 2023-10-19 ENCOUNTER — OFFICE VISIT (OUTPATIENT)
Dept: RHEUMATOLOGY | Age: 73
End: 2023-10-19
Payer: MEDICARE

## 2023-10-19 VITALS — BODY MASS INDEX: 30.04 KG/M2 | WEIGHT: 175 LBS

## 2023-10-19 DIAGNOSIS — T38.0X5A CORTICOSTEROID-INDUCED OSTEOPOROSIS: ICD-10-CM

## 2023-10-19 DIAGNOSIS — M33.90 DERMATOMYOSITIS (HCC): ICD-10-CM

## 2023-10-19 DIAGNOSIS — E11.9 TYPE 2 DIABETES MELLITUS WITHOUT COMPLICATION, WITH LONG-TERM CURRENT USE OF INSULIN (HCC): ICD-10-CM

## 2023-10-19 DIAGNOSIS — D84.9 IMMUNOSUPPRESSION (HCC): ICD-10-CM

## 2023-10-19 DIAGNOSIS — Z79.4 TYPE 2 DIABETES MELLITUS WITHOUT COMPLICATION, WITH LONG-TERM CURRENT USE OF INSULIN (HCC): ICD-10-CM

## 2023-10-19 DIAGNOSIS — C84.78 ANAPLASTIC ALK-NEGATIVE LARGE CELL LYMPHOMA OF LYMPH NODES OF MULTIPLE REGIONS (HCC): ICD-10-CM

## 2023-10-19 DIAGNOSIS — D89.89 ANTISYNTHETASE SYNDROME (HCC): Primary | ICD-10-CM

## 2023-10-19 DIAGNOSIS — M81.8 CORTICOSTEROID-INDUCED OSTEOPOROSIS: ICD-10-CM

## 2023-10-19 PROCEDURE — G8484 FLU IMMUNIZE NO ADMIN: HCPCS | Performed by: INTERNAL MEDICINE

## 2023-10-19 PROCEDURE — 99214 OFFICE O/P EST MOD 30 MIN: CPT | Performed by: INTERNAL MEDICINE

## 2023-10-19 PROCEDURE — 1123F ACP DISCUSS/DSCN MKR DOCD: CPT | Performed by: INTERNAL MEDICINE

## 2023-10-19 PROCEDURE — G8399 PT W/DXA RESULTS DOCUMENT: HCPCS | Performed by: INTERNAL MEDICINE

## 2023-10-19 PROCEDURE — G8427 DOCREV CUR MEDS BY ELIG CLIN: HCPCS | Performed by: INTERNAL MEDICINE

## 2023-10-19 PROCEDURE — 3046F HEMOGLOBIN A1C LEVEL >9.0%: CPT | Performed by: INTERNAL MEDICINE

## 2023-10-19 PROCEDURE — 3017F COLORECTAL CA SCREEN DOC REV: CPT | Performed by: INTERNAL MEDICINE

## 2023-10-19 PROCEDURE — G8417 CALC BMI ABV UP PARAM F/U: HCPCS | Performed by: INTERNAL MEDICINE

## 2023-10-19 PROCEDURE — 1036F TOBACCO NON-USER: CPT | Performed by: INTERNAL MEDICINE

## 2023-10-19 PROCEDURE — 2022F DILAT RTA XM EVC RTNOPTHY: CPT | Performed by: INTERNAL MEDICINE

## 2023-10-19 PROCEDURE — 1090F PRES/ABSN URINE INCON ASSESS: CPT | Performed by: INTERNAL MEDICINE

## 2023-10-19 RX ORDER — PREDNISONE 5 MG/1
5 TABLET ORAL DAILY
Qty: 90 TABLET | Refills: 2 | Status: SHIPPED | OUTPATIENT
Start: 2023-10-19

## 2023-10-19 ASSESSMENT — ENCOUNTER SYMPTOMS
COLOR CHANGE: 1
DIARRHEA: 0
SHORTNESS OF BREATH: 0
COUGH: 0
NAUSEA: 0
VOMITING: 0
ABDOMINAL PAIN: 0
TROUBLE SWALLOWING: 0

## 2023-10-19 NOTE — PATIENT INSTRUCTIONS
After 5 days of 100mg of prednisone resume 10mg daily for one month, then decrease to 5mg daily    Have blood work

## 2023-10-19 NOTE — PROGRESS NOTES
better on the steroids. She states that today is a decent day. She states that the hips are maybe a little stiff but not overly bothersome. She has also noticed the skin on her hands peeling. She has had a 23 pound weight loss since Thanksgiving but has not had much of an appetite. She has Raynaud's but this is a longstanding issue since she was a teenager. I reviewed blood work from her PCP which shows a positive NOAH of 1: 320, low positive rheumatoid factor of 18, and an elevated CK of 897. Past Medical History:   Diagnosis Date    Diabetes ([address])     Hyperlipemia     Hypertension     MMT (medial meniscus tear)     RIGHT  KNEE  AND SCHEDULED FOR THE SURGERY ON 01/30/2018    Polymyalgia ([address]) 2023    MUSCLE WEAKNESS    Primary osteoarthritis of right knee     Thyroid disease         Review of Systems   Constitutional:  Positive for fatigue. Negative for fever. HENT:  Negative for mouth sores and trouble swallowing. Respiratory:  Negative for cough and shortness of breath. Cardiovascular:  Negative for chest pain. Gastrointestinal:  Negative for abdominal pain, diarrhea, nausea and vomiting. Genitourinary:  Negative for dysuria and hematuria. Musculoskeletal:  Negative for arthralgias, joint swelling and myalgias. Skin:  Positive for color change. Negative for rash. Neurological:  Positive for weakness. Negative for numbness. Hematological:  Negative for adenopathy. All other systems reviewed and are negative. Objective   There were no vitals filed for this visit. Physical Exam  Constitutional:       General: She is not in acute distress. Appearance: Normal appearance. HENT:      Head: Normocephalic and atraumatic. Right Ear: External ear normal.      Left Ear: External ear normal.      Nose: Nose normal.   Eyes:      General: No scleral icterus. Pulmonary:      Effort: Pulmonary effort is normal.   Musculoskeletal:         General: Deformity present.  No swelling

## 2023-11-13 ENCOUNTER — HOSPITAL ENCOUNTER (OUTPATIENT)
Dept: INFUSION THERAPY | Age: 73
Discharge: HOME | End: 2023-11-13

## 2023-11-13 RX ORDER — OMEPRAZOLE 40 MG/1
40 CAPSULE, DELAYED RELEASE ORAL
Qty: 30 CAPSULE | Refills: 5 | Status: SHIPPED | OUTPATIENT
Start: 2023-11-13

## 2023-11-21 LAB — SURGICAL PATHOLOGY REPORT: NORMAL

## 2023-11-28 ENCOUNTER — HOSPITAL ENCOUNTER (OUTPATIENT)
Dept: INFUSION THERAPY | Age: 73
Discharge: HOME | End: 2023-11-28

## 2023-11-28 ENCOUNTER — OFFICE VISIT (OUTPATIENT)
Dept: ONCOLOGY | Age: 73
End: 2023-11-28
Payer: MEDICARE

## 2023-11-28 VITALS
OXYGEN SATURATION: 100 % | WEIGHT: 176.2 LBS | DIASTOLIC BLOOD PRESSURE: 65 MMHG | TEMPERATURE: 96.8 F | HEIGHT: 64 IN | BODY MASS INDEX: 30.08 KG/M2 | SYSTOLIC BLOOD PRESSURE: 155 MMHG | HEART RATE: 71 BPM

## 2023-11-28 DIAGNOSIS — C84.70 ANAPLASTIC ALK-NEGATIVE LARGE CELL LYMPHOMA, UNSPECIFIED BODY REGION (HCC): Primary | ICD-10-CM

## 2023-11-28 PROCEDURE — 3078F DIAST BP <80 MM HG: CPT | Performed by: STUDENT IN AN ORGANIZED HEALTH CARE EDUCATION/TRAINING PROGRAM

## 2023-11-28 PROCEDURE — G8484 FLU IMMUNIZE NO ADMIN: HCPCS | Performed by: STUDENT IN AN ORGANIZED HEALTH CARE EDUCATION/TRAINING PROGRAM

## 2023-11-28 PROCEDURE — 3017F COLORECTAL CA SCREEN DOC REV: CPT | Performed by: STUDENT IN AN ORGANIZED HEALTH CARE EDUCATION/TRAINING PROGRAM

## 2023-11-28 PROCEDURE — 1123F ACP DISCUSS/DSCN MKR DOCD: CPT | Performed by: STUDENT IN AN ORGANIZED HEALTH CARE EDUCATION/TRAINING PROGRAM

## 2023-11-28 PROCEDURE — 1036F TOBACCO NON-USER: CPT | Performed by: STUDENT IN AN ORGANIZED HEALTH CARE EDUCATION/TRAINING PROGRAM

## 2023-11-28 PROCEDURE — G8427 DOCREV CUR MEDS BY ELIG CLIN: HCPCS | Performed by: STUDENT IN AN ORGANIZED HEALTH CARE EDUCATION/TRAINING PROGRAM

## 2023-11-28 PROCEDURE — 99213 OFFICE O/P EST LOW 20 MIN: CPT | Performed by: STUDENT IN AN ORGANIZED HEALTH CARE EDUCATION/TRAINING PROGRAM

## 2023-11-28 PROCEDURE — 1090F PRES/ABSN URINE INCON ASSESS: CPT | Performed by: STUDENT IN AN ORGANIZED HEALTH CARE EDUCATION/TRAINING PROGRAM

## 2023-11-28 PROCEDURE — G8417 CALC BMI ABV UP PARAM F/U: HCPCS | Performed by: STUDENT IN AN ORGANIZED HEALTH CARE EDUCATION/TRAINING PROGRAM

## 2023-11-28 PROCEDURE — G8399 PT W/DXA RESULTS DOCUMENT: HCPCS | Performed by: STUDENT IN AN ORGANIZED HEALTH CARE EDUCATION/TRAINING PROGRAM

## 2023-11-28 PROCEDURE — 99212 OFFICE O/P EST SF 10 MIN: CPT

## 2023-11-28 PROCEDURE — 3074F SYST BP LT 130 MM HG: CPT | Performed by: STUDENT IN AN ORGANIZED HEALTH CARE EDUCATION/TRAINING PROGRAM

## 2023-11-30 ASSESSMENT — ENCOUNTER SYMPTOMS
COUGH: 0
SHORTNESS OF BREATH: 0
BACK PAIN: 0
GASTROINTESTINAL NEGATIVE: 1

## 2023-11-30 NOTE — PROGRESS NOTES
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 female    Chief Complaint:   Chief Complaint   Patient presents with    Follow-up     Lymphadenopathy       Subjective:  Patient started treatment with brentuximab-CHP through [facility]. Overall doing well without c/o major side effects. She appears comfortable. HPI from Initial Outpatient Consultation  (4/20/23): The patient is a 67 y.o. female comes in due to findings of lymphadenopathy on CT scans. The patient follows rheumatology with Dr. Tierra Harrison, and currently being worked up for myositis. The patient reports having some symptoms, including not limited to a 40 pound weight loss since November 2022, and has been struggling with dysphagia/choking, which she was noted to be associate with her rheumatologic diagnosis. Further rheumatologic evaluation included CT scans, in which there was adenopathy involving the chest, abdomen, groin/pelvis. There was also noted indeterminate lucent versus potential lytic lesions involving the lower spine and pelvis. Patient was subsequently referred to our service for further evaluation and care. Today, patient was accompanied by her . She does have fatigue. Otherwise denies of any bleed. She reports her last colonoscopy was back in 2019, and her last mammogram was back in October 2022. It has been several years since her last Pap smear. Otherwise she denies of significant bony pains. She has difficulty raising her arms above her head. Also denies of feeling any lymph nodes. Review of Systems; Review of Systems   Constitutional:  Negative for chills, diaphoresis and fever.    HENT:

## 2023-12-14 DIAGNOSIS — D84.9 IMMUNOSUPPRESSION (HCC): ICD-10-CM

## 2023-12-14 DIAGNOSIS — R59.1 LYMPHADENOPATHY: ICD-10-CM

## 2023-12-14 DIAGNOSIS — M35.3 POLYMYALGIA (HCC): Primary | ICD-10-CM

## 2023-12-16 RX ORDER — PREDNISONE 5 MG/1
TABLET ORAL
Qty: 120 TABLET | Refills: 2 | Status: SHIPPED | OUTPATIENT
Start: 2023-12-16

## 2024-03-21 ENCOUNTER — APPOINTMENT (OUTPATIENT)
Dept: HEMATOLOGY/ONCOLOGY | Facility: CLINIC | Age: 74
End: 2024-03-21
Payer: COMMERCIAL

## (undated) DEVICE — DOUBLE BASIN SET: Brand: MEDLINE INDUSTRIES, INC.

## (undated) DEVICE — MARKER,SKIN,WI/RULER AND LABELS: Brand: MEDLINE

## (undated) DEVICE — APPLICATOR PREP 26ML 0.7% IOD POVACRYLEX 74% ISO ALC ST

## (undated) DEVICE — SPONGE,LAP,4"X18",XR,ST,5/PK,40PK/CS: Brand: MEDLINE INDUSTRIES, INC.

## (undated) DEVICE — NEEDLE FLTR 18GA L1.5IN MEM THK5UM BLNT DISP

## (undated) DEVICE — ELECTRODE PT RET AD L9FT HI MOIST COND ADH HYDRGEL CORDED

## (undated) DEVICE — BLADE,STAINLESS-STEEL,15,STRL,DISPOSABLE: Brand: MEDLINE

## (undated) DEVICE — TOWEL,OR,DSP,ST,BLUE,STD,6/PK,12PK/CS: Brand: MEDLINE

## (undated) DEVICE — DRAPE,LAPAROTOMY,PCH,STERILE: Brand: MEDLINE

## (undated) DEVICE — GLOVE SURG SZ 7 L12IN FNGR THK94MIL TRNSLUC YEL LTX HYDRGEL

## (undated) DEVICE — SOLUTION IRRIG 1000ML 0.9% SOD CHL USP POUR PLAS BTL

## (undated) DEVICE — NEEDLE HYPO 25GA L1.5IN BLU POLYPR HUB S STL REG BVL STR

## (undated) DEVICE — BLADE ES ELASTOMERIC COAT INSUL DURABLE BEND UPTO 90DEG

## (undated) DEVICE — PACK PROCEDURE SURG GEN CUST

## (undated) DEVICE — GOWN,SIRUS,FABRNF,XL,20/CS: Brand: MEDLINE

## (undated) DEVICE — 4-PORT MANIFOLD: Brand: NEPTUNE 2